# Patient Record
Sex: FEMALE | Race: WHITE | URBAN - METROPOLITAN AREA
[De-identification: names, ages, dates, MRNs, and addresses within clinical notes are randomized per-mention and may not be internally consistent; named-entity substitution may affect disease eponyms.]

---

## 2018-05-24 PROBLEM — I50.21 ACUTE CLINICAL SYSTOLIC HEART FAILURE (HCC): Status: ACTIVE | Noted: 2018-05-24

## 2018-05-31 ENCOUNTER — OFFICE VISIT (OUTPATIENT)
Dept: CARDIOLOGY CLINIC | Age: 58
End: 2018-05-31

## 2018-05-31 VITALS
DIASTOLIC BLOOD PRESSURE: 64 MMHG | HEART RATE: 76 BPM | BODY MASS INDEX: 20.98 KG/M2 | OXYGEN SATURATION: 98 % | HEIGHT: 62 IN | WEIGHT: 114 LBS | SYSTOLIC BLOOD PRESSURE: 102 MMHG

## 2018-05-31 DIAGNOSIS — I10 ESSENTIAL HYPERTENSION: ICD-10-CM

## 2018-05-31 DIAGNOSIS — E87.1 HYPONATREMIA: ICD-10-CM

## 2018-05-31 DIAGNOSIS — I50.21 ACUTE SYSTOLIC HEART FAILURE (HCC): Primary | ICD-10-CM

## 2018-05-31 DIAGNOSIS — I42.8 NONISCHEMIC CARDIOMYOPATHY (HCC): ICD-10-CM

## 2018-05-31 PROCEDURE — 99214 OFFICE O/P EST MOD 30 MIN: CPT | Performed by: NURSE PRACTITIONER

## 2018-05-31 RX ORDER — METOPROLOL SUCCINATE 50 MG/1
25 TABLET, EXTENDED RELEASE ORAL DAILY
COMMUNITY
End: 2018-08-08 | Stop reason: SDUPTHER

## 2018-06-01 ENCOUNTER — HOSPITAL ENCOUNTER (OUTPATIENT)
Dept: OTHER | Age: 58
Discharge: HOME | End: 2018-06-30
Attending: INTERNAL MEDICINE | Admitting: INTERNAL MEDICINE

## 2018-06-05 ENCOUNTER — TELEPHONE (OUTPATIENT)
Dept: PHARMACY | Facility: CLINIC | Age: 58
End: 2018-06-05

## 2018-06-20 ENCOUNTER — OFFICE VISIT (OUTPATIENT)
Dept: PHARMACY | Facility: CLINIC | Age: 58
End: 2018-06-20

## 2018-06-20 VITALS
SYSTOLIC BLOOD PRESSURE: 111 MMHG | HEART RATE: 71 BPM | BODY MASS INDEX: 21.66 KG/M2 | DIASTOLIC BLOOD PRESSURE: 72 MMHG | WEIGHT: 118.4 LBS | OXYGEN SATURATION: 98 %

## 2018-06-20 DIAGNOSIS — I50.21 ACUTE SYSTOLIC HEART FAILURE (HCC): ICD-10-CM

## 2018-06-20 LAB
ANION GAP SERPL CALCULATED.3IONS-SCNC: 16 MMOL/L (ref 3–16)
BUN BLDV-MCNC: 20 MG/DL (ref 7–20)
CALCIUM SERPL-MCNC: 9.7 MG/DL (ref 8.3–10.6)
CHLORIDE BLD-SCNC: 95 MMOL/L (ref 99–110)
CO2: 23 MMOL/L (ref 21–32)
CREAT SERPL-MCNC: <0.5 MG/DL (ref 0.6–1.1)
GFR AFRICAN AMERICAN: >60
GFR NON-AFRICAN AMERICAN: >60
GLUCOSE BLD-MCNC: 98 MG/DL (ref 70–99)
POTASSIUM SERPL-SCNC: 4.1 MMOL/L (ref 3.5–5.1)
SODIUM BLD-SCNC: 134 MMOL/L (ref 136–145)

## 2018-07-01 ENCOUNTER — HOSPITAL ENCOUNTER (OUTPATIENT)
Dept: OTHER | Age: 58
Discharge: HOME | End: 2018-07-31
Attending: INTERNAL MEDICINE | Admitting: INTERNAL MEDICINE

## 2018-08-01 ENCOUNTER — HOSPITAL ENCOUNTER (OUTPATIENT)
Dept: OTHER | Age: 58
Discharge: HOME | End: 2018-08-31
Attending: INTERNAL MEDICINE | Admitting: INTERNAL MEDICINE

## 2018-08-08 ENCOUNTER — OFFICE VISIT (OUTPATIENT)
Dept: CARDIOLOGY CLINIC | Age: 58
End: 2018-08-08

## 2018-08-08 VITALS
OXYGEN SATURATION: 98 % | HEIGHT: 62 IN | SYSTOLIC BLOOD PRESSURE: 142 MMHG | WEIGHT: 121 LBS | BODY MASS INDEX: 22.26 KG/M2 | HEART RATE: 96 BPM | DIASTOLIC BLOOD PRESSURE: 68 MMHG

## 2018-08-08 DIAGNOSIS — I42.8 NONISCHEMIC CARDIOMYOPATHY (HCC): Primary | ICD-10-CM

## 2018-08-08 DIAGNOSIS — I50.22 CHRONIC SYSTOLIC HF (HEART FAILURE) (HCC): ICD-10-CM

## 2018-08-08 DIAGNOSIS — R00.0 TACHYCARDIA: ICD-10-CM

## 2018-08-08 DIAGNOSIS — I10 ESSENTIAL HYPERTENSION: ICD-10-CM

## 2018-08-08 PROCEDURE — 93000 ELECTROCARDIOGRAM COMPLETE: CPT | Performed by: NURSE PRACTITIONER

## 2018-08-08 PROCEDURE — 99214 OFFICE O/P EST MOD 30 MIN: CPT | Performed by: NURSE PRACTITIONER

## 2018-08-08 RX ORDER — METOPROLOL SUCCINATE 50 MG/1
50 TABLET, EXTENDED RELEASE ORAL DAILY
Qty: 30 TABLET | Refills: 3 | Status: SHIPPED | OUTPATIENT
Start: 2018-08-08 | End: 2018-12-12 | Stop reason: SDUPTHER

## 2018-08-08 NOTE — PROGRESS NOTES
[address]  Office Visit    [place]  1960 August 8, 2018    CC:   Chief Complaint   Patient presents with    Follow-up     hf,htn,pt said she feels fine , no cardiac complaints at this time     HPI:  The patient is 62 y.o. female with a past medical history significant for SHF, tobacco use, asthma, hyponatremia, ETOH use who is here for follow up. Admitted 5/24/2018-5/29/2018 with SOB and LE edema. Echo showed LVEF 25% and she was diuresed. She was seen in follow up here in the office and no med changes made at that time. Here for follow up today. Overall feeling quite well. Monitoring sodium and fluids closely. HR at home has been running 50-60's. Feels very anxious today being in the office and feels her HR has increased as a result. Continues to work full-time. Denies chest pain/discomfort, SOB, orthopnea/PND, cough, palpitations, dizziness, syncope, edema , weight change or claudication. Has remained off cigarettes and has noted increased appetite since quitting. Had fall few weeks ago getting out of shower (tripped) and hit lower back (pain has improved). Review of Systems:  Constitutional: Denies  fatigue, weakness, night sweats or fever. HEENT: Denies new visual changes, ringing in ears, nosebleeds, nasal congestion  Respiratory: Denies new or change in SOB, PND, orthopnea or cough. Cardiovascular: see HPI  GI: Denies N/V, diarrhea, constipation, abdominal pain, change in bowel habits, melena or hematochezia  : Denies urinary frequency, urgency, incontinence, hematuria or dysuria. Skin: Denies rash, hives, or cyanosis  Musculoskeletal: Denies joint or muscle aches/pain  Neurological: Denies syncope or TIA-like symptoms. Psychiatric: Denies anxiety, insomnia or depression     Past Medical History:   Diagnosis Date    Asthma     CHF (congestive heart failure) ([facility] [address]     Hypertension      History reviewed. No pertinent surgical history.   Family History a normal mood and affect. Her speech is normal and behavior is normal.     Lab Review:   No results found for: TRIG, HDL, LDLCALC, LDLDIRECT, LABVLDL  Lab Results   Component Value Date     06/20/2018    K 4.1 06/20/2018    CL 95 06/20/2018    CO2 23 06/20/2018    BUN 20 06/20/2018    CREATININE <0.5 06/20/2018    GLUCOSE 98 06/20/2018    CALCIUM 9.7 06/20/2018      Lab Results   Component Value Date    WBC 8.8 05/25/2018    HGB 13.6 05/25/2018    HCT 38.5 05/25/2018    MCV 96.8 05/25/2018     05/25/2018     Echo: 5/24/18  Overall left ventricular systolic function appears severely reduced. Ejection fraction is visually estimated to be 20-25% with diffuse hypokinesis. Left ventricular cavity size is mildly dilated. Diastolic filling parameters suggest grade II diastolic dysfunction. The right ventricle is normal in size and function. The left atrium is severely dilated. Moderate eccentric mitral regurgitation directed posteriorly. Mild tricuspid regurgitation.     Lexiscan-Myoview: 5/26/18  No reversible ischemia. Likely a dilated nonischemic cardiomyopathy. ECG 8/8/2018:  Sinus rhythm with HR 93; LVH; diffuse TW abnormalities    Assessment:    1. Nonischemic cardiomyopathy  -negative Lexiscan-Myoview in 5/2018  -continue ACE-I, aldactone and BB    2. Chronic systolic HF (heart failure) (HCC)  -currently compensated and NYHA class II  -LVEF 20-25% on echo in 5/2018  -continue lasix, aldactone, BB and ACE-I  -low sodium diet and fluid restriction    3. Essential hypertension  -slightly elevated today (goal BP < 120/80 given CHF)  -continue medical management  -will adjust BB given increased HR and BP today    4.  H/O hyponatremia  -normalized after diuresis  -will follow up    Plan:  Increase Toprol to 25 mg BID given her heart rate today  Continue aldactone, lisinopril, lasix  Emphasized and discussed low-fat/low sodium diet, monitoring of daily weights, fluid restriction, worsening signs and symptoms of heart failure and when to call, and the importance of regular exercise and activity. Check echocardiogram in 4-6 weeks  Check BMP  Congratulated on her smoking cessation and encouraged to remain tobacco free. Follow up with Dr. Carly George or D. Enzweiler, CNP in 3 months or sooner if needed    Return in about 3 months (around 11/8/2018) for with Dr. Carly George or D. Enzweiler, CNP. Thanks for allowing me to participate in the care of this patient.       [facility], [address] [facility], [address] [place] [address] [address] [facility], [address] [place], [place] [address]  Office: [phone]  Fax: [phone]

## 2018-08-29 ENCOUNTER — OFFICE VISIT (OUTPATIENT)
Dept: PHARMACY | Facility: CLINIC | Age: 58
End: 2018-08-29

## 2018-08-29 VITALS
HEART RATE: 91 BPM | OXYGEN SATURATION: 98 % | WEIGHT: 122.6 LBS | DIASTOLIC BLOOD PRESSURE: 78 MMHG | SYSTOLIC BLOOD PRESSURE: 120 MMHG | BODY MASS INDEX: 22.42 KG/M2

## 2018-08-29 DIAGNOSIS — I50.22 CHRONIC SYSTOLIC HF (HEART FAILURE) (HCC): ICD-10-CM

## 2018-08-29 DIAGNOSIS — I50.22 CHRONIC SYSTOLIC HEART FAILURE (HCC): Primary | ICD-10-CM

## 2018-08-29 DIAGNOSIS — I42.8 NONISCHEMIC CARDIOMYOPATHY (HCC): ICD-10-CM

## 2018-08-29 LAB
ANION GAP SERPL CALCULATED.3IONS-SCNC: 16 MMOL/L (ref 3–16)
BUN BLDV-MCNC: 17 MG/DL (ref 7–20)
CALCIUM SERPL-MCNC: 10.2 MG/DL (ref 8.3–10.6)
CHLORIDE BLD-SCNC: 93 MMOL/L (ref 99–110)
CO2: 23 MMOL/L (ref 21–32)
CREAT SERPL-MCNC: 0.6 MG/DL (ref 0.6–1.1)
GFR AFRICAN AMERICAN: >60
GFR NON-AFRICAN AMERICAN: >60
GLUCOSE BLD-MCNC: 106 MG/DL (ref 70–99)
POTASSIUM SERPL-SCNC: 4.1 MMOL/L (ref 3.5–5.1)
SODIUM BLD-SCNC: 132 MMOL/L (ref 136–145)

## 2018-08-30 RX ORDER — FUROSEMIDE 20 MG/1
20 TABLET ORAL DAILY
Qty: 30 TABLET | Refills: 2 | Status: SHIPPED | OUTPATIENT
Start: 2018-08-30 | End: 2018-12-12 | Stop reason: SDUPTHER

## 2018-08-30 RX ORDER — LISINOPRIL 2.5 MG/1
2.5 TABLET ORAL DAILY
Qty: 30 TABLET | Refills: 2 | Status: SHIPPED | OUTPATIENT
Start: 2018-08-30 | End: 2018-12-12 | Stop reason: SDUPTHER

## 2018-08-30 RX ORDER — SPIRONOLACTONE 25 MG/1
25 TABLET ORAL NIGHTLY
Qty: 30 TABLET | Refills: 2 | Status: SHIPPED | OUTPATIENT
Start: 2018-08-30 | End: 2018-12-12 | Stop reason: SDUPTHER

## 2018-08-30 NOTE — PROGRESS NOTES
Other    Comments:  No acute findings    Objective     Patient Active Problem List   Diagnosis Code    Acute systolic heart failure (HCC) I50.21    Hyponatremia E87.1    Essential hypertension I10     Social History   Substance Use Topics    Smoking status: Former Smoker     Packs/day: 0.50     Types: Cigarettes    Smokeless tobacco: Former User    Alcohol use 1.2 oz/week     2 Cans of beer per week         BMP:   Lab Results   Component Value Date     08/29/2018    K 4.1 08/29/2018    CL 93 08/29/2018    CO2 23 08/29/2018    BUN 17 08/29/2018    CREATININE 0.6 08/29/2018     CBC:    Lab Results   Component Value Date    WBC 8.8 05/25/2018    HGB 13.6 05/25/2018    HCT 38.5 05/25/2018     05/25/2018     HgA1C: No results found for: LABA1C  TSH: (ref range 0.27 - 4.20 uIU/mL)  Lab Results   Component Value Date    TSH 2.72 05/24/2018     Lipids: No results found for: CHOL, TRIG, HDL, LDLCALC, VLDL  ProBNP:   Lab Results   Component Value Date    PROBNP 3,469 05/27/2018    PROBNP 4,287 05/25/2018    PROBNP 45,225 05/23/2018       Assessment    /78   Pulse 91   Wt 122 lb 9.6 oz (55.6 kg)   SpO2 98%   BMI 22.42 kg/m²     BP Readings from Last 3 Encounters:   08/29/18 120/78   08/08/18 (!) 142/68   06/20/18 111/72       Wt Readings from Last 3 Encounters:   08/29/18 122 lb 9.6 oz (55.6 kg)   08/08/18 121 lb (54.9 kg)   06/20/18 118 lb 6.4 oz (53.7 kg)       Addressed weight gain / loss: She has been gaining weight slowly over time. She feels this is due to not smoking. No signs of edema. Reviewed daily weight log and assessed self management skills: Yes    Encouraged daily weights and to call with increase of 3 pounds in one day or 5 pounds in one week or weight increased above dry weight      Discussed fluid and sodium restriction:  Yes , she is very compliant with her diet and fluid and sodium restriction. Reading labels. Keeping each meal at < 500mg sodium or even less.   She </= 40% with MI (Okay to use if SCr </= 2.5mg/dL in men, SCr </= 2mg/dL in women; Potassium < 5.0meq/L)    Ms. Blima Apley is on a Diuretic         Is the patient taking medications that should be avoided   with Heart Failure such as those listed below?: No: avoiding NSAIDs    NSAIDs:           Thiazolidinediones (pioglitazone):        Cilostazol (Pletal):           Saxagliptin (Onglyza) or Alogliptin (Shannen Keys):     Aliskiren (Tekturna) which is contraindication with an ACE Inh or ARB or Entresto in patients with Diabetes    Reviewed need for labs: Yes, bella BMP today. Reviewed lab results from today's visit and abnormal results were called to the Physician Yes    Addressed co-morbidities significant to Heart Failure:  1. Hypertension - at goal        Addressed Heart Failure Team needs:   No needs    Additional assessment:   Ms. Blima Apley appears very well. She has no questions, no concerns. Following appropriate therapy recommendations. Declines cardiac rehab - very active, continues to work. Declines dietitian - has excellent understanding of diet restrictions. Plan     Daily weights, sodium and fluid restrictions. Report any unusual signs or symptoms including those of heart failure. Report weight gain of 3 pounds in one day or 5 pounds in one week. Hypertension goal <130/80, unless otherwise instructed. Smoking Cessation  Alcohol abstinence    Action taken:  Sent in refills for Lasix, Spironolactone, and Lisinopril. Enough to last until she sees [facility] in November.      The [address] [place] [address] Avenue has referred the patient to the following:  [] Cardiac Rehab [phone]) (see guidelines below)  [] [facility] ([phone])  [] [address] ([phone])  [] Social Work ([phone])   [] Avinash Varghese ([phone])  [] Other  [x] None at this time    Recommendations to the physician:  None    Patient Instructions:  Continue your great work to remain smoke free and following diet restrictions. [facility] Heart Failure Service Follow-up: Will check in by phone in January to assess for further needs at that time.       Follow-up visit will include:   [] Nurse visit, patient assessment  [] Heart Failure Diet Plan with Dietitian  [] BMP  [] BNP  [] Other Labs  [] Labs as needed, to be determined during the visit  [] Physical activity plan  [] Spiritual Care, Advanced Directives  [] Comprehensive Medication Review with the Pharmacist  [] Medication Review  [] Review Patient's Log Booklet    [x] Self Management Skills including daily weights and sodium and fluid restrictions  [x] Smoking Cessation, encourage continued smoke free status  [] Compliance  [] Other          Electronically signed by Ritu Mi PharmD on 8/30/2018 at 9:22 AM

## 2018-09-01 ENCOUNTER — HOSPITAL ENCOUNTER (OUTPATIENT)
Dept: OTHER | Age: 58
Discharge: HOME | End: 2018-09-01
Attending: INTERNAL MEDICINE | Admitting: INTERNAL MEDICINE

## 2018-12-12 ENCOUNTER — TELEPHONE (OUTPATIENT)
Dept: PHARMACY | Age: 58
End: 2018-12-12

## 2018-12-12 RX ORDER — LISINOPRIL 2.5 MG/1
2.5 TABLET ORAL DAILY
Qty: 30 TABLET | Refills: 0 | Status: SHIPPED | OUTPATIENT
Start: 2018-12-12 | End: 2019-01-09 | Stop reason: SDUPTHER

## 2018-12-12 RX ORDER — FUROSEMIDE 20 MG/1
20 TABLET ORAL DAILY
Qty: 30 TABLET | Refills: 0 | Status: SHIPPED | OUTPATIENT
Start: 2018-12-12 | End: 2019-01-09 | Stop reason: SDUPTHER

## 2018-12-12 RX ORDER — METOPROLOL SUCCINATE 50 MG/1
50 TABLET, EXTENDED RELEASE ORAL DAILY
Qty: 30 TABLET | Refills: 0 | Status: SHIPPED | OUTPATIENT
Start: 2018-12-12 | End: 2019-01-09 | Stop reason: SDUPTHER

## 2018-12-12 RX ORDER — SPIRONOLACTONE 25 MG/1
25 TABLET ORAL DAILY
Qty: 30 TABLET | Refills: 0 | Status: SHIPPED | OUTPATIENT
Start: 2018-12-12 | End: 2019-01-09 | Stop reason: SDUPTHER

## 2019-01-10 RX ORDER — LISINOPRIL 2.5 MG/1
2.5 TABLET ORAL DAILY
Qty: 30 TABLET | Refills: 0 | Status: SHIPPED | OUTPATIENT
Start: 2019-01-10 | End: 2019-01-15 | Stop reason: SDUPTHER

## 2019-01-10 RX ORDER — FUROSEMIDE 20 MG/1
20 TABLET ORAL DAILY
Qty: 30 TABLET | Refills: 0 | Status: SHIPPED | OUTPATIENT
Start: 2019-01-10 | End: 2019-06-05 | Stop reason: SDUPTHER

## 2019-01-10 RX ORDER — SPIRONOLACTONE 25 MG/1
25 TABLET ORAL DAILY
Qty: 30 TABLET | Refills: 0 | Status: SHIPPED | OUTPATIENT
Start: 2019-01-10 | End: 2019-01-15 | Stop reason: SDUPTHER

## 2019-01-10 RX ORDER — METOPROLOL SUCCINATE 50 MG/1
50 TABLET, EXTENDED RELEASE ORAL DAILY
Qty: 30 TABLET | Refills: 0 | Status: SHIPPED | OUTPATIENT
Start: 2019-01-10 | End: 2019-01-15 | Stop reason: SDUPTHER

## 2019-01-15 ENCOUNTER — OFFICE VISIT (OUTPATIENT)
Dept: CARDIOLOGY CLINIC | Age: 59
End: 2019-01-15
Payer: COMMERCIAL

## 2019-01-15 VITALS
HEART RATE: 99 BPM | DIASTOLIC BLOOD PRESSURE: 64 MMHG | BODY MASS INDEX: 23.57 KG/M2 | HEIGHT: 63 IN | SYSTOLIC BLOOD PRESSURE: 110 MMHG | OXYGEN SATURATION: 98 % | WEIGHT: 133 LBS

## 2019-01-15 DIAGNOSIS — I42.8 NONISCHEMIC CARDIOMYOPATHY (HCC): Primary | ICD-10-CM

## 2019-01-15 DIAGNOSIS — I10 ESSENTIAL HYPERTENSION: ICD-10-CM

## 2019-01-15 DIAGNOSIS — I50.22 CHRONIC SYSTOLIC HF (HEART FAILURE) (HCC): ICD-10-CM

## 2019-01-15 PROCEDURE — 99213 OFFICE O/P EST LOW 20 MIN: CPT | Performed by: NURSE PRACTITIONER

## 2019-01-15 RX ORDER — SPIRONOLACTONE 25 MG/1
25 TABLET ORAL DAILY
Qty: 30 TABLET | Refills: 3 | Status: SHIPPED | OUTPATIENT
Start: 2019-01-15 | End: 2019-06-05 | Stop reason: SDUPTHER

## 2019-01-15 RX ORDER — METOPROLOL SUCCINATE 50 MG/1
50 TABLET, EXTENDED RELEASE ORAL DAILY
Qty: 30 TABLET | Refills: 3 | Status: SHIPPED | OUTPATIENT
Start: 2019-01-15 | End: 2019-06-05 | Stop reason: SDUPTHER

## 2019-01-15 RX ORDER — LISINOPRIL 2.5 MG/1
2.5 TABLET ORAL DAILY
Qty: 30 TABLET | Refills: 3 | Status: SHIPPED | OUTPATIENT
Start: 2019-01-15 | End: 2019-06-05 | Stop reason: SDUPTHER

## 2019-01-16 ENCOUNTER — SCHEDULED TELEPHONE ENCOUNTER (OUTPATIENT)
Dept: PHARMACY | Age: 59
End: 2019-01-16

## 2019-01-30 ENCOUNTER — SCHEDULED TELEPHONE ENCOUNTER (OUTPATIENT)
Dept: PHARMACY | Age: 59
End: 2019-01-30

## 2019-02-13 ENCOUNTER — SCHEDULED TELEPHONE ENCOUNTER (OUTPATIENT)
Dept: PHARMACY | Age: 59
End: 2019-02-13

## 2019-03-14 ENCOUNTER — SCHEDULED TELEPHONE ENCOUNTER (OUTPATIENT)
Dept: PHARMACY | Age: 59
End: 2019-03-14

## 2019-04-01 ENCOUNTER — TELEPHONE (OUTPATIENT)
Dept: PHARMACY | Age: 59
End: 2019-04-01

## 2019-04-01 NOTE — TELEPHONE ENCOUNTER
[address] [facility]  Heart Failure Service  [phone]        Follow up phone call:     I called patient again to reschedule last missed appointment I did leave a message to please call back to let us know if she will be returning to the [facility] . She was last seen here in December of 2018, she does have a follow up appointment at [facility] in May of 2019.     PLAN:   I will discuss possible discharge or continue to try scheduling appointments.     Appropriate staff has been notified with regards to any concerns noted above   [address] Service  [phone]

## 2019-04-12 NOTE — TELEPHONE ENCOUNTER
We will discharge since we have made several attempts to reach her unsuccessfully. And she did not show for her March visit. She does have a good understanding of her heart failure care and was doing well when we last saw her. She is welcome any time.      Herbert Goss, [address] [facility]  Heart Failure Service  [phone]

## 2019-06-05 DIAGNOSIS — I50.22 CHRONIC SYSTOLIC HF (HEART FAILURE) (HCC): ICD-10-CM

## 2019-06-05 DIAGNOSIS — I42.8 NONISCHEMIC CARDIOMYOPATHY (HCC): ICD-10-CM

## 2019-06-05 RX ORDER — FUROSEMIDE 20 MG/1
20 TABLET ORAL DAILY
Qty: 30 TABLET | Refills: 3 | Status: SHIPPED | OUTPATIENT
Start: 2019-06-05 | End: 2019-10-07 | Stop reason: SDUPTHER

## 2019-06-05 RX ORDER — METOPROLOL SUCCINATE 50 MG/1
50 TABLET, EXTENDED RELEASE ORAL DAILY
Qty: 30 TABLET | Refills: 3 | Status: SHIPPED | OUTPATIENT
Start: 2019-06-05 | End: 2019-10-07 | Stop reason: SDUPTHER

## 2019-06-05 RX ORDER — SPIRONOLACTONE 25 MG/1
25 TABLET ORAL DAILY
Qty: 30 TABLET | Refills: 3 | Status: SHIPPED | OUTPATIENT
Start: 2019-06-05 | End: 2019-10-07 | Stop reason: SDUPTHER

## 2019-06-05 RX ORDER — LISINOPRIL 2.5 MG/1
2.5 TABLET ORAL DAILY
Qty: 30 TABLET | Refills: 3 | Status: SHIPPED | OUTPATIENT
Start: 2019-06-05 | End: 2019-10-07 | Stop reason: SDUPTHER

## 2019-06-05 NOTE — TELEPHONE ENCOUNTER
Medication Refill    When was your last appointment with cardiology?      (if  it's been more than 1 year, please go ahead and schedule an appointment with the physician while you have the patient on the phone)      Medication needing refilled: Spironolactone  Metoprolol  Lisinopril  Furosemide    Doseage of the medication:  25 mg  50 mg  2.5 mg  20 mg    How are you taking this medication (QD, BID, TID, QID, PRN):  Take 1 tablet by mouth daily  Take 1 tablet by mouth daily  Take 1 tablet by mouth daily  Take 1 tablet by mouth daily    Patient want a 30 or 90 day supply called in:    Which Pharmacy are we sending the medication to:  [address], [address]

## 2019-06-05 NOTE — TELEPHONE ENCOUNTER
She needs BMP performed prior to any further refills. Needs to keep her appointment as scheduled next month with Dr. Gonsalo Pollack. Please call Christal Portillo.

## 2019-07-31 ENCOUNTER — OFFICE VISIT (OUTPATIENT)
Dept: CARDIOLOGY CLINIC | Age: 59
End: 2019-07-31
Payer: COMMERCIAL

## 2019-07-31 VITALS
HEART RATE: 97 BPM | DIASTOLIC BLOOD PRESSURE: 70 MMHG | SYSTOLIC BLOOD PRESSURE: 130 MMHG | WEIGHT: 134 LBS | OXYGEN SATURATION: 97 % | HEIGHT: 62 IN | BODY MASS INDEX: 24.66 KG/M2

## 2019-07-31 DIAGNOSIS — Z72.0 TOBACCO ABUSE: ICD-10-CM

## 2019-07-31 DIAGNOSIS — I10 ESSENTIAL HYPERTENSION: ICD-10-CM

## 2019-07-31 DIAGNOSIS — I50.22 CHRONIC SYSTOLIC CHF (CONGESTIVE HEART FAILURE), NYHA CLASS 2 (HCC): Primary | ICD-10-CM

## 2019-07-31 DIAGNOSIS — I50.22 CHRONIC SYSTOLIC HEART FAILURE (HCC): ICD-10-CM

## 2019-07-31 LAB
ANION GAP SERPL CALCULATED.3IONS-SCNC: 16 MMOL/L (ref 3–16)
BUN BLDV-MCNC: 11 MG/DL (ref 7–20)
CALCIUM SERPL-MCNC: 10.4 MG/DL (ref 8.3–10.6)
CHLORIDE BLD-SCNC: 96 MMOL/L (ref 99–110)
CO2: 23 MMOL/L (ref 21–32)
CREAT SERPL-MCNC: 0.6 MG/DL (ref 0.6–1.1)
GFR AFRICAN AMERICAN: >60
GFR NON-AFRICAN AMERICAN: >60
GLUCOSE BLD-MCNC: 85 MG/DL (ref 70–99)
POTASSIUM SERPL-SCNC: 4.6 MMOL/L (ref 3.5–5.1)
PRO-BNP: 400 PG/ML (ref 0–124)
SODIUM BLD-SCNC: 135 MMOL/L (ref 136–145)

## 2019-07-31 PROCEDURE — 99214 OFFICE O/P EST MOD 30 MIN: CPT | Performed by: INTERNAL MEDICINE

## 2019-07-31 PROCEDURE — 93000 ELECTROCARDIOGRAM COMPLETE: CPT | Performed by: INTERNAL MEDICINE

## 2019-07-31 NOTE — PATIENT INSTRUCTIONS
if:    · You have new or increased shortness of breath.     · You are dizzy or lightheaded, or you feel like you may faint.     · You have sudden weight gain, such as more than 2 to 3 pounds in a day or 5 pounds in a week. (Your doctor may suggest a different range of weight gain.)     · You have increased swelling in your legs, ankles, or feet.     · You are suddenly so tired or weak that you cannot do your usual activities.    Watch closely for changes in your health, and be sure to contact your doctor if you develop new symptoms. Where can you learn more? Go to https://[email or web address] [email or web address]. org and sign in to your [employer] account. Enter P052 in the [employer] box to learn more about \"Managing Other Conditions When You Have Heart Failure: Care Instructions. \"     If you do not have an account, please click on the \"Sign Up Now\" link. Current as of: July 22, 2018  Content Version: 12.0  © 0209-9671 Healthwise, Incorporated. Care instructions adapted under license by [facility] ([facility]). If you have questions about a medical condition or this instruction, always ask your healthcare professional. [place] [address] any warranty or liability for your use of this information.

## 2019-10-07 DIAGNOSIS — I42.8 NONISCHEMIC CARDIOMYOPATHY (HCC): ICD-10-CM

## 2019-10-07 DIAGNOSIS — I50.22 CHRONIC SYSTOLIC HF (HEART FAILURE) (HCC): ICD-10-CM

## 2019-10-07 RX ORDER — METOPROLOL SUCCINATE 50 MG/1
50 TABLET, EXTENDED RELEASE ORAL DAILY
Qty: 30 TABLET | Refills: 5 | Status: CANCELLED | OUTPATIENT
Start: 2019-10-07

## 2019-10-07 RX ORDER — FUROSEMIDE 20 MG/1
20 TABLET ORAL DAILY
Qty: 30 TABLET | Refills: 5 | Status: CANCELLED | OUTPATIENT
Start: 2019-10-07

## 2019-10-07 RX ORDER — LISINOPRIL 2.5 MG/1
2.5 TABLET ORAL DAILY
Qty: 30 TABLET | Refills: 3 | Status: SHIPPED | OUTPATIENT
Start: 2019-10-07 | End: 2020-02-11

## 2019-10-07 RX ORDER — FUROSEMIDE 20 MG/1
20 TABLET ORAL DAILY
Qty: 30 TABLET | Refills: 3 | Status: SHIPPED | OUTPATIENT
Start: 2019-10-07 | End: 2020-02-11

## 2019-10-07 RX ORDER — LISINOPRIL 2.5 MG/1
2.5 TABLET ORAL DAILY
Qty: 30 TABLET | Refills: 5 | Status: CANCELLED | OUTPATIENT
Start: 2019-10-07

## 2019-10-07 RX ORDER — SPIRONOLACTONE 25 MG/1
25 TABLET ORAL DAILY
Qty: 30 TABLET | Refills: 5 | Status: CANCELLED | OUTPATIENT
Start: 2019-10-07

## 2019-10-07 RX ORDER — SPIRONOLACTONE 25 MG/1
25 TABLET ORAL DAILY
Qty: 30 TABLET | Refills: 3 | Status: SHIPPED | OUTPATIENT
Start: 2019-10-07 | End: 2020-02-11

## 2019-10-07 RX ORDER — METOPROLOL SUCCINATE 50 MG/1
50 TABLET, EXTENDED RELEASE ORAL DAILY
Qty: 30 TABLET | Refills: 3 | Status: SHIPPED | OUTPATIENT
Start: 2019-10-07 | End: 2020-02-11

## 2019-10-08 DIAGNOSIS — I50.22 CHRONIC SYSTOLIC HF (HEART FAILURE) (HCC): ICD-10-CM

## 2019-10-08 DIAGNOSIS — I42.8 NONISCHEMIC CARDIOMYOPATHY (HCC): ICD-10-CM

## 2019-10-08 RX ORDER — LISINOPRIL 2.5 MG/1
2.5 TABLET ORAL DAILY
Qty: 30 TABLET | Refills: 3 | Status: CANCELLED | OUTPATIENT
Start: 2019-10-08

## 2019-10-08 RX ORDER — SPIRONOLACTONE 25 MG/1
25 TABLET ORAL DAILY
Qty: 30 TABLET | Refills: 3 | Status: CANCELLED | OUTPATIENT
Start: 2019-10-08

## 2019-10-08 RX ORDER — FUROSEMIDE 20 MG/1
20 TABLET ORAL DAILY
Qty: 30 TABLET | Refills: 3 | Status: CANCELLED | OUTPATIENT
Start: 2019-10-08

## 2019-10-08 RX ORDER — METOPROLOL SUCCINATE 50 MG/1
50 TABLET, EXTENDED RELEASE ORAL DAILY
Qty: 30 TABLET | Refills: 3 | Status: CANCELLED | OUTPATIENT
Start: 2019-10-08

## 2020-02-11 RX ORDER — FUROSEMIDE 20 MG/1
TABLET ORAL
Qty: 30 TABLET | Refills: 0 | Status: SHIPPED | OUTPATIENT
Start: 2020-02-11 | End: 2020-03-10 | Stop reason: SDUPTHER

## 2020-02-11 RX ORDER — LISINOPRIL 2.5 MG/1
TABLET ORAL
Qty: 30 TABLET | Refills: 1 | Status: SHIPPED | OUTPATIENT
Start: 2020-02-11 | End: 2020-04-14 | Stop reason: SDUPTHER

## 2020-02-11 RX ORDER — METOPROLOL SUCCINATE 50 MG/1
TABLET, EXTENDED RELEASE ORAL
Qty: 30 TABLET | Refills: 0 | Status: SHIPPED | OUTPATIENT
Start: 2020-02-11 | End: 2020-03-10 | Stop reason: SDUPTHER

## 2020-02-11 RX ORDER — SPIRONOLACTONE 25 MG/1
TABLET ORAL
Qty: 30 TABLET | Refills: 0 | Status: SHIPPED | OUTPATIENT
Start: 2020-02-11 | End: 2020-03-10 | Stop reason: SDUPTHER

## 2020-03-10 RX ORDER — METOPROLOL SUCCINATE 50 MG/1
TABLET, EXTENDED RELEASE ORAL
Qty: 30 TABLET | Refills: 2 | Status: SHIPPED | OUTPATIENT
Start: 2020-03-10 | End: 2020-07-14 | Stop reason: SDUPTHER

## 2020-03-10 RX ORDER — SPIRONOLACTONE 25 MG/1
TABLET ORAL
Qty: 30 TABLET | Refills: 2 | Status: SHIPPED | OUTPATIENT
Start: 2020-03-10 | End: 2020-07-14 | Stop reason: SDUPTHER

## 2020-03-10 RX ORDER — FUROSEMIDE 20 MG/1
TABLET ORAL
Qty: 30 TABLET | Refills: 2 | Status: SHIPPED | OUTPATIENT
Start: 2020-03-10 | End: 2020-07-14 | Stop reason: SDUPTHER

## 2020-04-14 RX ORDER — LISINOPRIL 2.5 MG/1
TABLET ORAL
Qty: 30 TABLET | Refills: 1 | Status: CANCELLED | OUTPATIENT
Start: 2020-04-14

## 2020-04-15 RX ORDER — LISINOPRIL 2.5 MG/1
TABLET ORAL
Qty: 30 TABLET | Refills: 1 | Status: SHIPPED | OUTPATIENT
Start: 2020-04-15 | End: 2020-07-14 | Stop reason: SDUPTHER

## 2020-07-14 RX ORDER — FUROSEMIDE 20 MG/1
TABLET ORAL
Qty: 30 TABLET | Refills: 5 | Status: SHIPPED | OUTPATIENT
Start: 2020-07-14 | End: 2021-01-21

## 2020-07-14 RX ORDER — SPIRONOLACTONE 25 MG/1
TABLET ORAL
Qty: 30 TABLET | Refills: 5 | Status: SHIPPED | OUTPATIENT
Start: 2020-07-14 | End: 2021-01-21

## 2020-07-14 RX ORDER — METOPROLOL SUCCINATE 50 MG/1
TABLET, EXTENDED RELEASE ORAL
Qty: 30 TABLET | Refills: 5 | Status: SHIPPED | OUTPATIENT
Start: 2020-07-14 | End: 2021-01-21

## 2020-07-14 RX ORDER — LISINOPRIL 2.5 MG/1
TABLET ORAL
Qty: 30 TABLET | Refills: 5 | Status: SHIPPED | OUTPATIENT
Start: 2020-07-14 | End: 2021-01-21

## 2020-07-24 RX ORDER — POTASSIUM CHLORIDE 750 MG/1
10 TABLET, EXTENDED RELEASE ORAL DAILY
Qty: 30 TABLET | Refills: 5 | OUTPATIENT
Start: 2020-07-24

## 2020-07-24 NOTE — TELEPHONE ENCOUNTER
The pharmacist is suggesting the patient be placed on Potassium Chloride 10MEQ due to muscle cramps in their legs 1-2 times per week and pt is on a loop diuretic.

## 2020-07-24 NOTE — TELEPHONE ENCOUNTER
Will not adjust medications as patient has not been seen in 1 year and has not had any lab work completed. Will need CMP and lipids completed. IF K+ is normal, no need to add KCL.      Please call

## 2020-07-27 NOTE — TELEPHONE ENCOUNTER
Please call to notify the patient to complete lab work and follow up with Dr. Enedelia Zuluaga as scheduled 7/29/20. Refills can be completed during office visit.

## 2020-07-27 NOTE — TELEPHONE ENCOUNTER
Patient states she does not take potassium and never has besides the hospital. Patient does not want to complete blood work at this time. She states the lisinopril and metoprolol was already sent in.

## 2020-07-29 ENCOUNTER — OFFICE VISIT (OUTPATIENT)
Dept: CARDIOLOGY CLINIC | Age: 60
End: 2020-07-29

## 2020-07-29 ENCOUNTER — TELEPHONE (OUTPATIENT)
Dept: CARDIOLOGY CLINIC | Age: 60
End: 2020-07-29

## 2020-07-29 VITALS
HEIGHT: 62 IN | DIASTOLIC BLOOD PRESSURE: 78 MMHG | OXYGEN SATURATION: 96 % | SYSTOLIC BLOOD PRESSURE: 120 MMHG | BODY MASS INDEX: 25.62 KG/M2 | TEMPERATURE: 98.3 F | WEIGHT: 139.2 LBS | HEART RATE: 107 BPM

## 2020-07-29 PROCEDURE — 93000 ELECTROCARDIOGRAM COMPLETE: CPT | Performed by: INTERNAL MEDICINE

## 2020-07-29 PROCEDURE — 99214 OFFICE O/P EST MOD 30 MIN: CPT | Performed by: INTERNAL MEDICINE

## 2020-07-29 NOTE — PROGRESS NOTES
Brett  1960 July 29, 2020      CC: \"I have been gaining weight. \"    HPI:  The patient is 61 y.o. female with a past medical history significant for tobacco abuse, asthma and systolic heart failure who was first seen in 2018 when he presented to [facility] with shortness of breath. She stated that her legs were bloated three days. She also noted that her abdomen at her waistline was distended. She related feeling short of breath climbing her 15 steps at home. There was some chest tightness that felt like \"bronchitis\". 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 Discharged after being hospitalized for 3 days. Has been following up with [facility]. Today, she is here for routine follow up. She says that she has gained weight due inactivity. She has gained about 6 pounds. She has no issues breathing and is able to lay flat without any issue as well. She has not completed her repeat echocardiogram due to not having insurance. She continues to smoke but says that she has cut down. Patient denies exertional chest pain/pressure, dyspnea at rest, CANO, PND, orthopnea, palpitations, lightheadedness, weight changes, changes in LE edema, and syncope. Review of Systems:  Constitutional: No fatigue, weakness, night sweats or fever. HEENT: No new vision difficulties or ringing in the ears. Respiratory: No new SOB, PND, orthopnea or cough. Cardiovascular: See HPI   GI: No n/v, diarrhea, constipation, abdominal pain or changes in bowel habits. No melena, no hematochezia  : No urinary frequency, urgency, incontinence, hematuria or dysuria. Skin: No cyanosis or skin lesions. Musculoskeletal: No new muscle or joint pain. Neurological: No syncope or TIA-like symptoms.   Psychiatric: No anxiety, insomnia or depression     Past Medical History:   Diagnosis Date    Asthma     CHF (congestive heart failure) ([facility] [address]     regular rhythm, normal heart sounds and intact distal pulses. Exam reveals no gallop and no friction rub. No murmur heard. Pulmonary/Chest: Effort normal and breath sounds normal. No respiratory distress. She has no wheezes, rhonchi or rales. Abdominal: Soft, non-tender. Bowel sounds and aorta are normal. She exhibits no organomegaly, mass or bruit. Extremities: No edema, cyanosis, or clubbing. Pulses are 2+ radial/carotid/dorsalis pedis and posterior tibial bilaterally. Neurological: She is alert and oriented to person, place, and time. She has normal reflexes. No cranial nerve deficit. Coordination normal.   Skin: Skin is warm and dry. There is no rash or diaphoresis. Psychiatric: She has a normal mood and affect. Her speech is normal and behavior is normal.       EKG Interpretation: 7/31/19 Sinus rhythm     7/29/2020 Sinus rhythm       Stress Test: 5/24/18  Abnormal study.    Severely dilated left ventricle with severely reduced ejection fraction of    23 %.    There is a moderate sized, mild intensity, inferior wall defect which    appears worse at rest and is most consistent with diaphragmatic attenuation    and bowel artifact. However, cannot exclude prior inferior infarct as the    wall motion/wall thickening are not normal globally.    Overall findings represent a high risk study based on severely reduced LV    function. ECHO: 5/24/18  Overall left ventricular systolic function appears severely reduced. Ejection fraction is visually estimated to be 20-25% with diffuse hypokinesis. Left ventricular cavity size is mildly dilated. Diastolic filling parameters suggest grade II diastolic dysfunction. The right ventricle is normal in size and function. The left atrium is severely dilated. Moderate eccentric mitral regurgitation directed posteriorly. Mild tricuspid regurgitation.     Lab Review:     Lab Results   Component Value Date     07/31/2019    K 4.6 07/31/2019    BUN 11 07/31/2019 CREATININE 0.6 07/31/2019         Assessment:  1. Chronic Systolic CHF, class 2  2. Essential Hypertension  3. Tobacco Abuse    Plan:  She appears euvolemic on exam except for some scattered wheezes. Increase Lasix to 40 mg x 2 days then resume 20 mg daily due to her recent weight gain. Continue b-blocker and Ace-I. I will repeat her echocardiogram prior to next visit. She does not have insurance at this time. I have encouraged smoking cessation. I will see her back in follow up in 6 months. This note was scribed in the presence of Dr Mayank Zendejas, by Saúl Johnson RN    Physician Attestation:  The scribes documentation has been prepared under my direction and personally reviewed by me in its entirety. I, Dr. Mayank Zendejas personally performed the services described in this documentation as scribed by my RN,  Saúl Johnson in my presence, and I confirm that the note above accurately reflects all work, treatment, procedures, and medical decision making performed by me.

## 2020-07-29 NOTE — TELEPHONE ENCOUNTER
Debbie Eli from [address] [place] [address] called in stating that she spoke to the patient and she states that she is having muscle cramps more than twice a week. She states that the patient is not on any potassium and recommends that we add potassium into her medication regimen due to the muscle cramping. You can reach Debbie Eli at [phone], ext. [phone].

## 2020-07-29 NOTE — TELEPHONE ENCOUNTER
Patient is taking lasix 20 mg daily, spironolactone 25 daily.      Last BMP was 7/31/19 and potassium was normal 4.6

## 2020-07-29 NOTE — PATIENT INSTRUCTIONS
Patient Education        Stopping Smoking: Care Instructions  Your Care Instructions     Cigarette smokers crave the nicotine in cigarettes. Giving it up is much harder than simply changing a habit. Your body has to stop craving the nicotine. It is hard to quit, but you can do it. There are many tools that people use to quit smoking. You may find that combining tools works best for you. There are several steps to quitting. First you get ready to quit. Then you get support to help you. After that, you learn new skills and behaviors to become a nonsmoker. For many people, a necessary step is getting and using medicine. Your doctor will help you set up the plan that best meets your needs. You may want to attend a smoking cessation program to help you quit smoking. When you choose a program, look for one that has proven success. Ask your doctor for ideas. You will greatly increase your chances of success if you take medicine as well as get counseling or join a cessation program.  Some of the changes you feel when you first quit tobacco are uncomfortable. Your body will miss the nicotine at first, and you may feel short-tempered and grumpy. You may have trouble sleeping or concentrating. Medicine can help you deal with these symptoms. You may struggle with changing your smoking habits and rituals. The last step is the tricky one: Be prepared for the smoking urge to continue for a time. This is a lot to deal with, but keep at it. You will feel better. Follow-up care is a key part of your treatment and safety. Be sure to make and go to all appointments, and call your doctor if you are having problems. It's also a good idea to know your test results and keep a list of the medicines you take. How can you care for yourself at home? · Ask your family, friends, and coworkers for support. You have a better chance of quitting if you have help and support.   · Join a support group, such as Nicotine Anonymous, for people who are trying to quit smoking. · Consider signing up for a smoking cessation program, such as the American Lung Association's Freedom from Smoking program.  · Get text messaging support. Go to the website at www.smokefree. gov to sign up for the [facility] program.  · Set a quit date. Pick your date carefully so that it is not right in the middle of a big deadline or stressful time. Once you quit, do not even take a puff. Get rid of all ashtrays and lighters after your last cigarette. Clean your house and your clothes so that they do not smell of smoke. · Learn how to be a nonsmoker. Think about ways you can avoid those things that make you reach for a cigarette. ? Avoid situations that put you at greatest risk for smoking. For some people, it is hard to have a drink with friends without smoking. For others, they might skip a coffee break with coworkers who smoke. ? Change your daily routine. Take a different route to work or eat a meal in a different place. · Cut down on stress. Calm yourself or release tension by doing an activity you enjoy, such as reading a book, taking a hot bath, or gardening. · Talk to your doctor or pharmacist about nicotine replacement therapy, which replaces the nicotine in your body. You still get nicotine but you do not use tobacco. Nicotine replacement products help you slowly reduce the amount of nicotine you need. These products come in several forms, many of them available over-the-counter:  ? Nicotine patches  ? Nicotine gum and lozenges  ? Nicotine inhaler  · Ask your doctor about bupropion (Wellbutrin) or varenicline (Chantix), which are prescription medicines. They do not contain nicotine. They help you by reducing withdrawal symptoms, such as stress and anxiety. · Some people find hypnosis, acupuncture, and massage helpful for ending the smoking habit. · Eat a healthy diet and get regular exercise.  Having healthy habits will help your body move past its craving for nicotine. · Be prepared to keep trying. Most people are not successful the first few times they try to quit. Do not get mad at yourself if you smoke again. Make a list of things you learned and think about when you want to try again, such as next week, next month, or next year. Where can you learn more? Go to https://Ensapemalcolmewchey.Missingames. org and sign in to your [employer] account. Enter E484 in the Captify box to learn more about \"Stopping Smoking: Care Instructions. \"     If you do not have an account, please click on the \"Sign Up Now\" link. Current as of: March 12, 2020               Content Version: 12.5  © 2006-2020 Healthwise, Incorporated. Care instructions adapted under license by [facility] ([facility]). If you have questions about a medical condition or this instruction, always ask your healthcare professional. [address] any warranty or liability for your use of this information.

## 2020-07-30 NOTE — TELEPHONE ENCOUNTER
Spoke with patient and advised to complete a BMP next week to assess potassium and her kidney function due to temporary increase of Lasix.  Order placed and she v/u

## 2020-07-30 NOTE — TELEPHONE ENCOUNTER
I looked at her appointment desk and this patient was just seen yesterday with Dr. Gavino Ruiz. She is not supposed to return until January of 2021.

## 2021-01-21 DIAGNOSIS — I50.22 CHRONIC SYSTOLIC HF (HEART FAILURE) (HCC): ICD-10-CM

## 2021-01-21 DIAGNOSIS — I42.8 NONISCHEMIC CARDIOMYOPATHY (HCC): ICD-10-CM

## 2021-01-21 RX ORDER — SPIRONOLACTONE 25 MG/1
TABLET ORAL
Qty: 90 TABLET | Refills: 3 | Status: SHIPPED | OUTPATIENT
Start: 2021-01-21 | End: 2021-12-28

## 2021-01-21 RX ORDER — METOPROLOL SUCCINATE 50 MG/1
TABLET, EXTENDED RELEASE ORAL
Qty: 90 TABLET | Refills: 3 | Status: SHIPPED | OUTPATIENT
Start: 2021-01-21 | End: 2022-06-29 | Stop reason: SDUPTHER

## 2021-01-21 RX ORDER — LISINOPRIL 2.5 MG/1
TABLET ORAL
Qty: 90 TABLET | Refills: 3 | Status: SHIPPED | OUTPATIENT
Start: 2021-01-21 | End: 2021-12-28

## 2021-01-21 RX ORDER — FUROSEMIDE 20 MG/1
TABLET ORAL
Qty: 90 TABLET | Refills: 3 | Status: SHIPPED | OUTPATIENT
Start: 2021-01-21 | End: 2021-12-28

## 2021-04-12 NOTE — PROGRESS NOTES
[place]  1960 April 14, 2021      CC: \"I have been gaining weight. \"    HPI:  The patient is 61 y.o. female with a past medical history significant for tobacco abuse, asthma and systolic heart failure who was first seen in 2018 when he presented to [facility] with shortness of breath. She stated that her legs were bloated three days. She also noted that her abdomen at her waistline was distended. She related feeling short of breath climbing her 15 steps at home. There was some chest tightness that felt like \"bronchitis\". 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 Discharged after being hospitalized for 3 days. Today, she is here for routine follow up. She says that she has gained weight due inactivity. She has gained about 6 pounds. She has no issues breathing and is able to lay flat without any issue as well. She has not completed her repeat echocardiogram due to not having insurance. She continues to smoke but says that she has cut down. Patient denies exertional chest pain/pressure, dyspnea at rest, CANO, PND, orthopnea, palpitations, lightheadedness, weight changes, changes in LE edema, and syncope. Review of Systems:  Constitutional: No fatigue, weakness, night sweats or fever. HEENT: No new vision difficulties or ringing in the ears. Respiratory: No new SOB, PND, orthopnea or cough. Cardiovascular: See HPI   GI: No n/v, diarrhea, constipation, abdominal pain or changes in bowel habits. No melena, no hematochezia  : No urinary frequency, urgency, incontinence, hematuria or dysuria. Skin: No cyanosis or skin lesions. Musculoskeletal: No new muscle or joint pain. Neurological: No syncope or TIA-like symptoms.   Psychiatric: No anxiety, insomnia or depression     Past Medical History:   Diagnosis Date    Asthma     CHF (congestive heart failure) (HCC)     Hypertension      No past surgical history on file.  Family History   Problem Relation Age of Onset    Cancer Father         throat     Other Father         alzheimer     Social History     Tobacco Use    Smoking status: Former Smoker     Packs/day: 0.50     Types: Cigarettes    Smokeless tobacco: Former User   Substance Use Topics    Alcohol use: Yes     Alcohol/week: 2.0 standard drinks     Types: 2 Cans of beer per week    Drug use: No       Allergies   Allergen Reactions    Benadryl [Diphenhydramine]      Breaks out in hives      Current Outpatient Medications   Medication Sig Dispense Refill    metoprolol succinate (TOPROL XL) 50 MG extended release tablet Pt states she takes half pill in the morning and half pill in the evening 90 tablet 3    spironolactone (ALDACTONE) 25 MG tablet TAKE 1 TABLET (25MG) BY MOUTH EVERY DAY 90 tablet 3    furosemide (LASIX) 20 MG tablet TAKE 1 TABLET (20MG) BY MOUTH EVERY DAY 90 tablet 3    lisinopril (PRINIVIL;ZESTRIL) 2.5 MG tablet TAKE ONE TABLET BY MOUTH DAILY 90 tablet 3    Multiple Vitamins-Minerals (ONE-A-DAY WOMENS 50+ ADVANTAGE PO) Take 1 tablet by mouth daily       No current facility-administered medications for this visit. Physical Exam:   There were no vitals taken for this visit. No intake or output data in the 24 hours ending 04/14/21 0904  Wt Readings from Last 2 Encounters:   07/29/20 139 lb 3.2 oz (63.1 kg)   07/31/19 134 lb (60.8 kg)     Constitutional: She is oriented to person, place, and time. She appears well-developed and well-nourished. In no acute distress. Head: Normocephalic and atraumatic. Neck: Neck supple. No JVD present. Carotid bruit is not present. No mass and no thyromegaly present. No lymphadenopathy present. Cardiovascular: Normal rate, regular rhythm, normal heart sounds and intact distal pulses. Exam reveals no gallop and no friction rub. No murmur heard. Pulmonary/Chest: Effort normal and breath sounds normal. No respiratory distress.  She has no wheezes, rhonchi or rales. Abdominal: Soft, non-tender. Bowel sounds and aorta are normal. She exhibits no organomegaly, mass or bruit. Extremities: No edema, cyanosis, or clubbing. Pulses are 2+ radial/carotid/dorsalis pedis and posterior tibial bilaterally. Neurological: She is alert and oriented to person, place, and time. She has normal reflexes. No cranial nerve deficit. Coordination normal.   Skin: Skin is warm and dry. There is no rash or diaphoresis. Psychiatric: She has a normal mood and affect. Her speech is normal and behavior is normal.       EKG Interpretation 7/31/19: Sinus rhythm  EKG Interpretation 7/29/2020: Sinus rhythm       Imaging:     Stress Test 5/24/18  Abnormal study.    Severely dilated left ventricle with severely reduced ejection fraction of    23 %.    There is a moderate sized, mild intensity, inferior wall defect which    appears worse at rest and is most consistent with diaphragmatic attenuation    and bowel artifact. However, cannot exclude prior inferior infarct as the    wall motion/wall thickening are not normal globally.    Overall findings represent a high risk study based on severely reduced LV    function. Echo 5/24/18  Overall left ventricular systolic function appears severely reduced. Ejection fraction is visually estimated to be 20-25% with diffuse hypokinesis. Left ventricular cavity size is mildly dilated. Diastolic filling parameters suggest grade II diastolic dysfunction. The right ventricle is normal in size and function. The left atrium is severely dilated. Moderate eccentric mitral regurgitation directed posteriorly. Mild tricuspid regurgitation. Lab Review:     Lab Results   Component Value Date     07/31/2019    K 4.6 07/31/2019    BUN 11 07/31/2019    CREATININE 0.6 07/31/2019         Assessment:  1. Chronic Systolic CHF, class 2  2. Essential Hypertension  3. Tobacco Abuse    Plan:  She appears euvolemic on exam except for some scattered wheezes. Increase Lasix to 40 mg x 2 days then resume 20 mg daily due to her recent weight gain. Continue b-blocker and Ace-I. I will repeat her echocardiogram prior to next visit. She does not have insurance at this time. I have encouraged smoking cessation. I will see her back in follow up in 6 months.

## 2021-04-14 ENCOUNTER — OFFICE VISIT (OUTPATIENT)
Dept: CARDIOLOGY CLINIC | Age: 61
End: 2021-04-14

## 2021-04-14 VITALS
HEART RATE: 96 BPM | SYSTOLIC BLOOD PRESSURE: 140 MMHG | HEIGHT: 62 IN | WEIGHT: 142.4 LBS | DIASTOLIC BLOOD PRESSURE: 76 MMHG | BODY MASS INDEX: 26.2 KG/M2 | OXYGEN SATURATION: 96 %

## 2021-04-14 DIAGNOSIS — I50.22 CHRONIC SYSTOLIC CHF (CONGESTIVE HEART FAILURE), NYHA CLASS 2 (HCC): Primary | ICD-10-CM

## 2021-04-14 DIAGNOSIS — I10 ESSENTIAL HYPERTENSION: ICD-10-CM

## 2021-04-14 DIAGNOSIS — Z72.0 TOBACCO ABUSE: ICD-10-CM

## 2021-04-14 PROCEDURE — 99214 OFFICE O/P EST MOD 30 MIN: CPT | Performed by: INTERNAL MEDICINE

## 2021-04-14 NOTE — PATIENT INSTRUCTIONS

## 2021-04-14 NOTE — PROGRESS NOTES
[place]  1960 April 14, 2021      CC: \"I have been doing good. \"    HPI:  The patient is 61 y.o. female with a past medical history significant for tobacco abuse, asthma and systolic heart failure who was first seen in 2018 when he presented to [facility] with shortness of breath. She stated that her legs were bloated three days. She also noted that her abdomen at her waistline was distended. She related feeling short of breath climbing her 15 steps at home. There was some chest tightness that felt like \"bronchitis\". 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 Discharged after being hospitalized for 3 days. Today, she is here for follow up. She says that she is doing okay. She is concerned that he medications are giving her anxiety. She says that she is currently working at [employer] and is going to be getting insurance in a few days. She continues to not smoke. Patient denies exertional chest pain/pressure, dyspnea at rest, CANO, PND, orthopnea, palpitations, lightheadedness, weight changes, changes in LE edema, and syncope. Review of Systems:  Constitutional: No fatigue, weakness, night sweats or fever. HEENT: No new vision difficulties or ringing in the ears. Respiratory: No new SOB, PND, orthopnea or cough. Cardiovascular: See HPI   GI: No n/v, diarrhea, constipation, abdominal pain or changes in bowel habits. No melena, no hematochezia  : No urinary frequency, urgency, incontinence, hematuria or dysuria. Skin: No cyanosis or skin lesions. Musculoskeletal: No new muscle or joint pain. Neurological: No syncope or TIA-like symptoms. Psychiatric: No anxiety, insomnia or depression     Past Medical History:   Diagnosis Date    Asthma     CHF (congestive heart failure) (HCC)     Hypertension      History reviewed. No pertinent surgical history.   Family History   Problem Relation Age of Onset    Cancer Father         throat     Other Father         alzheimer     Social History     Tobacco Use    Smoking status: Former Smoker     Packs/day: 0.50     Types: Cigarettes    Smokeless tobacco: Former User   Substance Use Topics    Alcohol use: Yes     Alcohol/week: 2.0 standard drinks     Types: 2 Cans of beer per week    Drug use: No       Allergies   Allergen Reactions    Benadryl [Diphenhydramine]      Breaks out in hives      Current Outpatient Medications   Medication Sig Dispense Refill    metoprolol succinate (TOPROL XL) 50 MG extended release tablet Pt states she takes half pill in the morning and half pill in the evening 90 tablet 3    spironolactone (ALDACTONE) 25 MG tablet TAKE 1 TABLET (25MG) BY MOUTH EVERY DAY 90 tablet 3    furosemide (LASIX) 20 MG tablet TAKE 1 TABLET (20MG) BY MOUTH EVERY DAY 90 tablet 3    lisinopril (PRINIVIL;ZESTRIL) 2.5 MG tablet TAKE ONE TABLET BY MOUTH DAILY 90 tablet 3    Multiple Vitamins-Minerals (ONE-A-DAY WOMENS 50+ ADVANTAGE PO) Take 1 tablet by mouth daily       No current facility-administered medications for this visit. Physical Exam:   BP (!) 140/76   Pulse 96   Ht 5' 2\" (1.575 m)   Wt 142 lb 6.4 oz (64.6 kg)   SpO2 96%   BMI 26.05 kg/m²   No intake or output data in the 24 hours ending 04/14/21 1617  Wt Readings from Last 2 Encounters:   04/14/21 142 lb 6.4 oz (64.6 kg)   07/29/20 139 lb 3.2 oz (63.1 kg)     Constitutional: She is oriented to person, place, and time. She appears well-developed and well-nourished. In no acute distress. Head: Normocephalic and atraumatic. Neck: Neck supple. No JVD present. Carotid bruit is not present. No mass and no thyromegaly present. No lymphadenopathy present. Cardiovascular: Normal rate, regular rhythm, normal heart sounds and intact distal pulses. Exam reveals no gallop and no friction rub. No murmur heard. Pulmonary/Chest: Effort normal and breath sounds normal. No respiratory distress.  She has no wheezes, rhonchi or rales. Abdominal: Soft, non-tender. Bowel sounds and aorta are normal. She exhibits no organomegaly, mass or bruit. Extremities: No edema, cyanosis, or clubbing. Pulses are 2+ radial/carotid/dorsalis pedis and posterior tibial bilaterally. Neurological: She is alert and oriented to person, place, and time. She has normal reflexes. No cranial nerve deficit. Coordination normal.   Skin: Skin is warm and dry. There is no rash or diaphoresis. Psychiatric: She has a normal mood and affect. Her speech is normal and behavior is normal.       EKG Interpretation 7/31/19: Sinus rhythm  EKG Interpretation 7/29/2020: Sinus rhythm       Imaging:     Stress Test 5/24/18  Abnormal study.    Severely dilated left ventricle with severely reduced ejection fraction of    23 %.    There is a moderate sized, mild intensity, inferior wall defect which    appears worse at rest and is most consistent with diaphragmatic attenuation    and bowel artifact. However, cannot exclude prior inferior infarct as the    wall motion/wall thickening are not normal globally.    Overall findings represent a high risk study based on severely reduced LV    function. Echo 5/24/18  Overall left ventricular systolic function appears severely reduced. Ejection fraction is visually estimated to be 20-25% with diffuse hypokinesis. Left ventricular cavity size is mildly dilated. Diastolic filling parameters suggest grade II diastolic dysfunction. The right ventricle is normal in size and function. The left atrium is severely dilated. Moderate eccentric mitral regurgitation directed posteriorly. Mild tricuspid regurgitation. Lab Review:     Lab Results   Component Value Date     07/31/2019    K 4.6 07/31/2019    BUN 11 07/31/2019    CREATININE 0.6 07/31/2019         Assessment:  1. Chronic Systolic CHF, class 2  2. Essential Hypertension  3. Tobacco Abuse    Plan:  She appears euvolemic on exam today.  I will repeat an echocardiogram once she has insurance. I will continue all her cardiac medications including ACEI and beta blocker therapy. I have encouraged her continued smoking cessation. I will see her back in follow up in 1 year. This note was scribed in the presence of Dr Monty García, by Miracle Rubio RN  Physician Attestation:  The scribes documentation has been prepared under my direction and personally reviewed by me in its entirety. I, Dr. Monty García personally performed the services described in this documentation as scribed by my RN in my presence, and I confirm that the note above accurately reflects all work, treatment, procedures, and medical decision making performed by me.

## 2021-06-21 ENCOUNTER — TELEPHONE (OUTPATIENT)
Dept: CARDIOLOGY CLINIC | Age: 61
End: 2021-06-21

## 2021-06-21 NOTE — TELEPHONE ENCOUNTER
Dr. Gaetano Murdock will not be able to call in an antibiotic for the patient. She should be seen at either an Urgent Care or Urgent Dental office as it does not look as though she has a PCP. She can also try calling her dental office to see if they can prescribe this prior to her appointment. Please call to discuss.

## 2021-06-21 NOTE — TELEPHONE ENCOUNTER
Andresjacky Mayank called in this morning stating that she has an abscess tooth and wants to know if Dr. Suyapa Mcdonald can prescribe her an antibiotic until she can get into the dentist?    She asked we send it to [address] on [facility].  #[phone]

## 2021-07-16 ENCOUNTER — APPOINTMENT (OUTPATIENT)
Dept: GENERAL RADIOLOGY | Age: 61
End: 2021-07-16

## 2021-07-16 PROCEDURE — 99283 EMERGENCY DEPT VISIT LOW MDM: CPT

## 2021-07-16 PROCEDURE — 73630 X-RAY EXAM OF FOOT: CPT

## 2021-07-16 ASSESSMENT — PAIN DESCRIPTION - PAIN TYPE: TYPE: ACUTE PAIN

## 2021-07-16 ASSESSMENT — PAIN DESCRIPTION - DESCRIPTORS: DESCRIPTORS: THROBBING

## 2021-07-16 ASSESSMENT — PAIN DESCRIPTION - LOCATION: LOCATION: FOOT

## 2021-07-16 ASSESSMENT — PAIN DESCRIPTION - ONSET: ONSET: GRADUAL

## 2021-07-16 ASSESSMENT — PAIN DESCRIPTION - ORIENTATION: ORIENTATION: LEFT

## 2021-07-16 ASSESSMENT — PAIN DESCRIPTION - FREQUENCY: FREQUENCY: CONTINUOUS

## 2021-07-16 ASSESSMENT — PAIN SCALES - GENERAL: PAINLEVEL_OUTOF10: 6

## 2021-07-16 ASSESSMENT — PAIN DESCRIPTION - PROGRESSION: CLINICAL_PROGRESSION: GRADUALLY WORSENING

## 2021-07-17 ENCOUNTER — HOSPITAL ENCOUNTER (EMERGENCY)
Age: 61
Discharge: HOME | End: 2021-07-17

## 2021-07-17 VITALS
HEIGHT: 60 IN | SYSTOLIC BLOOD PRESSURE: 199 MMHG | WEIGHT: 142.2 LBS | OXYGEN SATURATION: 95 % | DIASTOLIC BLOOD PRESSURE: 78 MMHG | TEMPERATURE: 98 F | RESPIRATION RATE: 17 BRPM | HEART RATE: 85 BPM | BODY MASS INDEX: 27.92 KG/M2

## 2021-07-17 DIAGNOSIS — S92.502A CLOSED FRACTURE OF PHALANX OF LEFT FIFTH TOE, INITIAL ENCOUNTER: Primary | ICD-10-CM

## 2021-07-17 NOTE — ED TRIAGE NOTES
Pt to ED with complaints of L foot pain. Pt hit toe on coffee table two days ago. Pt states she has been elevating and using ice but is having no relief. 5th digit on L foot has small contusion but is not deformed. A/ox4.  Vss.

## 2021-07-17 NOTE — ED NOTES
Discharge and education instructions reviewed. Patient verbalized understanding, teach-back successful. Patient denied questions at this time. No acute distress noted. Patient instructed to follow-up as noted - return to emergency department if symptoms worsen. Patient verbalized understanding. Discharged per EDMD with discharge instructions.         Tim [facility]anaGe[facility]  07/17/21 8334

## 2021-07-17 NOTE — ED PROVIDER NOTES
**ADVANCED PRACTICE PROVIDER, I HAVE EVALUATED THIS PATIENT**        [address] [facility] ENCOUNTER      Pt Name: Johanny Kinney  [facility] [place] [facility]  [place] 1960  Date of evaluation: 2021  Provider: Charles Rodrigues PA-C      Chief Complaint:    Chief Complaint   Patient presents with    Foot Injury     left; hit on the coffee table wednesday         Nursing Notes, Past Medical Hx, Past Surgical Hx, Social Hx, Allergies, and Family Hx were all reviewed and agreed with or any disagreements were addressed in the HPI.    HPI: (Location, Duration, Timing, Severity, Quality, Assoc Sx, Context, Modifying factors)    Chief Complaint of pain bruising and swelling to the lateral distal left foot and toes area. This is a  61 y.o. female who presents with complaint of injury to the left distal lateral foot that occurred on Wednesday. She states that it was accidental at home. She was hurrying around and hit the pinky toe on a coffee table. She had instant pretty severe local pain. She has been doing a lot of home care for it since then including various over-the-counter medications and gentle soaks of the area. She still is having quite a bit of swelling and bruising and pain locally and decided to come in to be seen today. No midfoot pain or any ankle pain knee pain or hip pain. No radiating pain. Pain is local constant 6 out of 10 locally and worse if you push on it and better at rest.  She has missed a couple of days of work secondary to the injury. PastMedical/Surgical History:      Diagnosis Date    Asthma     CHF (congestive heart failure) (HCC)     Hypertension      History reviewed. No pertinent surgical history.     Medications:  Previous Medications    FUROSEMIDE (LASIX) 20 MG TABLET    TAKE 1 TABLET (20MG) BY MOUTH EVERY DAY    LISINOPRIL (PRINIVIL;ZESTRIL) 2.5 MG TABLET    TAKE ONE TABLET BY MOUTH DAILY    METOPROLOL SUCCINATE (TOPROL XL) 50 MG EXTENDED RELEASE TABLET    Pt states she takes half pill in the morning and half pill in the evening    MULTIPLE VITAMINS-MINERALS (ONE-A-DAY WOMENS 50+ ADVANTAGE PO)    Take 1 tablet by mouth daily    SPIRONOLACTONE (ALDACTONE) 25 MG TABLET    TAKE 1 TABLET (25MG) BY MOUTH EVERY DAY         Review of Systems:  (2-9 systems needed)  Review of Systems  Positive history as above with no fall or twisting injury. Positive for the contusion type injury and associated symptoms as above. No midfoot increased heat or redness or any ankle pain or tenderness. No knee pain or hip pain compared to usual.  No fevers or chills cough or congestion. No nausea or vomiting or any rash. No heel pain or Achilles pain or tenderness. \"Positives and Pertinent negatives as per HPI\"    Physical Exam:  Physical Exam  Vitals and nursing note reviewed. Constitutional:       Appearance: Normal appearance. She is not diaphoretic. HENT:      Head: Normocephalic and atraumatic. Right Ear: External ear normal.      Left Ear: External ear normal.      Nose: Nose normal.   Eyes:      General:         Right eye: No discharge. Left eye: No discharge. Conjunctiva/sclera: Conjunctivae normal.   Pulmonary:      Effort: Pulmonary effort is normal. No respiratory distress. Musculoskeletal:         General: Normal range of motion. Cervical back: Normal range of motion and neck supple. Feet:       Comments: Positive for the tenderness and ecchymosis as depicted on the left foot sketch. No ankle pain or tenderness, no midfoot pain or tenderness and distal pulses are 2+ bilaterally in dorsalis pedis. No heel pain or Achilles pain or tenderness. Knees without acute edema or tenderness. Skin:     General: Skin is warm and dry. Capillary Refill: Capillary refill takes less than 2 seconds. Neurological:      General: No focal deficit present.       Mental Status: She is alert and oriented to person, place, and time. Mental status is at baseline. Psychiatric:         Mood and Affect: Mood normal.         Behavior: Behavior normal.         Thought Content: Thought content normal.         MEDICAL DECISION MAKING    Vitals:    Vitals:    07/16/21 2127   BP: (!) 199/78   Pulse: 91   Resp: 17   Temp: 98 °F (36.7 °C)   TempSrc: Oral   SpO2: 95%   Weight: 142 lb 3.2 oz (64.5 kg)   Height: 5' (1.524 m)       LABS:Labs Reviewed - No data to display     Remainder of labs reviewed and were negative at this time or not returned at the time of this note. RADIOLOGY:   Non-plain film images such as CT, Ultrasound and MRI are read by the radiologist. Sirisha Monterroso PA-C have directly visualized the radiologic plain film image(s) with the below findings:      Interpretation per the Radiologist below, if available at the time of this note:    XR FOOT LEFT (MIN 3 VIEWS)   Final Result   1. Acute nondisplaced fracture of the proximal shaft of the 5th proximal   phalanx. 2. Chronic appearing juxta-articular erosion along the lateral aspect of the   5th metatarsal head. Recommend correlation with the possibility of gout. XR FOOT LEFT (MIN 3 VIEWS)    Result Date: 7/16/2021  EXAMINATION: THREE XRAY VIEWS OF THE LEFT FOOT 7/16/2021 9:56 pm COMPARISON: None. HISTORY: ORDERING SYSTEM PROVIDED HISTORY: injury TECHNOLOGIST PROVIDED HISTORY: Reason for exam:->injury Reason for Exam: stubbed 3rd, 4th, and 5th digit of left foot on coffee table Acuity: Acute Type of Exam: Initial FINDINGS: Normal midfoot alignment is maintained. Mild 1st metatarsophalangeal degenerative changes. Small plantar calcaneal enthesophyte. There is an acute nondisplaced fracture of the proximal shaft of the 5th proximal phalanx. No displaced fracture or dislocation. Chronic appearing juxta articular erosion along the lateral aspect of the 5th metatarsal head. The soft tissues are unremarkable.      1. Acute nondisplaced fracture of the proximal shaft of the 5th proximal phalanx. 2. Chronic appearing juxta-articular erosion along the lateral aspect of the 5th metatarsal head. Recommend correlation with the possibility of gout. MEDICAL DECISION MAKING / ED COURSE:      PROCEDURES:   Procedures    None    Patient was given:  Medications - No data to display  This patient presents as above and evaluation and treatment is begun. Some screening x-rays are obtained of the left foot and return as above showing the nondisplaced proximal phalanx left fifth toe acute fracture. Patient is offered some medication here for comfort but indicates that she already has taken some over-the-counter and would not like a prescription medication for comfort at this time. She is also offered a nonbending shoe to immobilize the affected area and she agrees to that plan. Otherwise conservative home care is discussed with the patient including outpatient follow-up. She verbalizes understanding and agreement with the above and the following plan that is developed together. The patient tolerated their visit well. I evaluated the patient. The physician was available for consultation as needed. The patient and / or the family were informed of the results of any tests, a time was given to answer questions, a plan was proposed and they agreed with plan. Home in good condition to use the immobilizing shoe anytime you are on your feet, otherwise alternate ice and heat, elevate and rest the area, using over-the-counter medication for comfort per box directions and monitoring for gradual improvement. Call orthopedics on Monday for further care and treatment. Return to the emergency department for any emergency worsening or concern. CLINICAL IMPRESSION:  1.  Closed fracture of phalanx of left fifth toe, initial encounter        DISPOSITION Decision To Discharge 07/17/2021 12:33:05 AM      PATIENT REFERRED TO:  [address] [facility]  [facility] [address]  [phone]  In 2 days  Call early Monday morning for additional care and treatment.       DISCHARGE MEDICATIONS:  New Prescriptions    No medications on file       DISCONTINUED MEDICATIONS:  Discontinued Medications    No medications on file              (Please note the MDM and HPI sections of this note were completed with a voice recognition program.  Efforts were made to edit the dictations but occasionally words are mis-transcribed.)    Electronically signed, Donn Herrera PA-C,          Donn Herrera PA-C  07/17/21 7471

## 2021-12-28 DIAGNOSIS — I42.8 NONISCHEMIC CARDIOMYOPATHY (HCC): ICD-10-CM

## 2021-12-28 DIAGNOSIS — I50.22 CHRONIC SYSTOLIC HF (HEART FAILURE) (HCC): ICD-10-CM

## 2021-12-28 RX ORDER — SPIRONOLACTONE 25 MG/1
TABLET ORAL
Qty: 90 TABLET | Refills: 1 | Status: SHIPPED | OUTPATIENT
Start: 2021-12-28 | End: 2022-08-02 | Stop reason: SDUPTHER

## 2021-12-28 RX ORDER — FUROSEMIDE 20 MG/1
TABLET ORAL
Qty: 90 TABLET | Refills: 1 | Status: SHIPPED | OUTPATIENT
Start: 2021-12-28 | End: 2022-08-02 | Stop reason: SDUPTHER

## 2021-12-28 RX ORDER — LISINOPRIL 2.5 MG/1
TABLET ORAL
Qty: 90 TABLET | Refills: 1 | Status: SHIPPED | OUTPATIENT
Start: 2021-12-28 | End: 2022-06-29 | Stop reason: SDUPTHER

## 2022-02-25 ENCOUNTER — TELEPHONE (OUTPATIENT)
Dept: CARDIOLOGY CLINIC | Age: 62
End: 2022-02-25

## 2022-02-25 NOTE — TELEPHONE ENCOUNTER
Received a fax from [facility] stating pt reported a dry cough to them and thinks we should switch her Lisinopril to Losartan. Scanned fax into chart.  LM for pt to call back so we can find out more info on her cough to see if a med change is necessary

## 2022-03-11 NOTE — TELEPHONE ENCOUNTER
Spoke to pt she states she's had this cough since she was a kid, it is from her allergies. Not a new symptom.

## 2022-06-28 DIAGNOSIS — I50.22 CHRONIC SYSTOLIC HF (HEART FAILURE) (HCC): ICD-10-CM

## 2022-06-28 DIAGNOSIS — I42.8 NONISCHEMIC CARDIOMYOPATHY (HCC): ICD-10-CM

## 2022-06-29 ENCOUNTER — OFFICE VISIT (OUTPATIENT)
Dept: CARDIOLOGY CLINIC | Age: 62
End: 2022-06-29
Payer: COMMERCIAL

## 2022-06-29 VITALS
WEIGHT: 141.4 LBS | DIASTOLIC BLOOD PRESSURE: 80 MMHG | HEART RATE: 101 BPM | HEIGHT: 60 IN | BODY MASS INDEX: 27.76 KG/M2 | OXYGEN SATURATION: 95 % | SYSTOLIC BLOOD PRESSURE: 136 MMHG

## 2022-06-29 DIAGNOSIS — I50.22 CHRONIC SYSTOLIC CHF (CONGESTIVE HEART FAILURE), NYHA CLASS 2 (HCC): Primary | ICD-10-CM

## 2022-06-29 DIAGNOSIS — I10 ESSENTIAL HYPERTENSION: ICD-10-CM

## 2022-06-29 DIAGNOSIS — I42.8 NONISCHEMIC CARDIOMYOPATHY (HCC): ICD-10-CM

## 2022-06-29 DIAGNOSIS — I50.22 CHRONIC SYSTOLIC HF (HEART FAILURE) (HCC): ICD-10-CM

## 2022-06-29 PROCEDURE — 93000 ELECTROCARDIOGRAM COMPLETE: CPT | Performed by: INTERNAL MEDICINE

## 2022-06-29 PROCEDURE — 99214 OFFICE O/P EST MOD 30 MIN: CPT | Performed by: INTERNAL MEDICINE

## 2022-06-29 RX ORDER — SPIRONOLACTONE 25 MG/1
TABLET ORAL
Qty: 90 TABLET | Refills: 3 | OUTPATIENT
Start: 2022-06-29

## 2022-06-29 RX ORDER — METOPROLOL SUCCINATE 50 MG/1
50 TABLET, EXTENDED RELEASE ORAL DAILY
Qty: 90 TABLET | Refills: 3 | Status: SHIPPED | OUTPATIENT
Start: 2022-06-29

## 2022-06-29 RX ORDER — FUROSEMIDE 20 MG/1
TABLET ORAL
Qty: 90 TABLET | Refills: 3 | OUTPATIENT
Start: 2022-06-29

## 2022-06-29 RX ORDER — LISINOPRIL 2.5 MG/1
TABLET ORAL
Qty: 90 TABLET | Refills: 3 | OUTPATIENT
Start: 2022-06-29

## 2022-06-29 RX ORDER — LISINOPRIL 5 MG/1
5 TABLET ORAL DAILY
Qty: 90 TABLET | Refills: 3 | Status: SHIPPED | OUTPATIENT
Start: 2022-06-29 | End: 2022-08-02 | Stop reason: SDUPTHER

## 2022-06-29 NOTE — PROGRESS NOTES
Brett  1960 June 29, 2022      CC: \"I feel fine\"     HPI:  The patient is 64 y.o. female with a past medical history significant for tobacco abuse, asthma and systolic heart failure. An echocardiogram completed 5/24/18 showed LVEF of 20-25%. She presents today for follow up and reports feeling well overall. Her breathing has been comfortable without shortness of breath. She is able to lay flat to sleep at night. Patient denies exertional chest pain/pressure, dyspnea at rest, CANO, PND, orthopnea, palpitations, lightheadedness, weight changes, changes in LE edema, and syncope. She reports medication compliance and is tolerating. Review of Systems:  Constitutional: No fatigue, weakness, night sweats or fever. HEENT: No new vision difficulties or ringing in the ears. Respiratory: No new SOB, PND, orthopnea or cough. Cardiovascular: See HPI   GI: No n/v, diarrhea, constipation, abdominal pain or changes in bowel habits. No melena, no hematochezia  : No urinary frequency, urgency, incontinence, hematuria or dysuria. Skin: No cyanosis or skin lesions. Musculoskeletal: No new muscle or joint pain. Neurological: No syncope or TIA-like symptoms. Psychiatric: No anxiety, insomnia or depression     Past Medical History:   Diagnosis Date    Asthma     CHF (congestive heart failure) (HCC)     Hypertension      History reviewed. No pertinent surgical history. Family History   Problem Relation Age of Onset    Cancer Father         throat     Other Father         alzheimer     Social History     Tobacco Use    Smoking status: Current Some Day Smoker     Packs/day: 0.50     Types: Cigarettes    Smokeless tobacco: Former User   Vaping Use    Vaping Use: Never used   Substance Use Topics    Alcohol use:  Yes     Alcohol/week: 2.0 standard drinks     Types: 2 Cans of beer per week     Comment: occ    Drug use: No       Allergies   Allergen Reactions    Benadryl [Diphenhydramine]      Breaks out in hives      Current Outpatient Medications   Medication Sig Dispense Refill    furosemide (LASIX) 20 MG tablet TAKE 1 TABLET (20MG) BY MOUTH EVERY DAY 90 tablet 1    lisinopril (PRINIVIL;ZESTRIL) 2.5 MG tablet TAKE ONE TABLET BY MOUTH DAILY 90 tablet 1    spironolactone (ALDACTONE) 25 MG tablet TAKE 1 TABLET (25MG) BY MOUTH EVERY DAY 90 tablet 1    metoprolol succinate (TOPROL XL) 50 MG extended release tablet Pt states she takes half pill in the morning and half pill in the evening (Patient taking differently: Take 50 mg by mouth daily ) 90 tablet 3    Multiple Vitamins-Minerals (ONE-A-DAY WOMENS 50+ ADVANTAGE PO) Take 1 tablet by mouth daily       No current facility-administered medications for this visit. Physical Exam:   /80   Pulse (!) 101   Ht 5' (1.524 m)   Wt 141 lb 6.4 oz (64.1 kg)   SpO2 95%   BMI 27.62 kg/m²   No intake or output data in the 24 hours ending 06/29/22 1617  Wt Readings from Last 2 Encounters:   06/29/22 141 lb 6.4 oz (64.1 kg)   07/16/21 142 lb 3.2 oz (64.5 kg)     Constitutional: She is oriented to person, place, and time. She appears well-developed and well-nourished. In no acute distress. Head: Normocephalic and atraumatic. Neck: Neck supple. No JVD present. Carotid bruit is not present. No mass and no thyromegaly present. No lymphadenopathy present. Cardiovascular: Normal rate, regular rhythm, normal heart sounds and intact distal pulses. Exam reveals no gallop and no friction rub. No murmur heard. Pulmonary/Chest: Effort normal and breath sounds normal. No respiratory distress. She has no wheezes, rhonchi or rales. Abdominal: Soft, non-tender. Bowel sounds and aorta are normal. She exhibits no organomegaly, mass or bruit. Extremities: No edema, cyanosis, or clubbing. Pulses are 2+ radial/carotid/dorsalis pedis and posterior tibial bilaterally.   Neurological: She is alert and oriented to monitor daily weights as well as her sodium intake and call with signs of fluid retention. I have personally reviewed all previous testing for this visit today including imaging, lab results and EKG as detailed above. I will see her back in follow up in 6 months. This note was scribed in the presence of Andrea Reddy MD by [employer], RN. Physician Attestation:  The scribes documentation has been prepared under my direction and personally reviewed by me in its entirety. I, Dr. Fay Boxer personally performed the services described in this documentation as scribed by my RN in my presence, and I confirm that the note above accurately reflects all work, treatment, procedures, and medical decision making performed by me.

## 2022-08-02 DIAGNOSIS — I50.22 CHRONIC SYSTOLIC HF (HEART FAILURE) (HCC): ICD-10-CM

## 2022-08-02 DIAGNOSIS — I50.22 CHRONIC SYSTOLIC CHF (CONGESTIVE HEART FAILURE), NYHA CLASS 2 (HCC): ICD-10-CM

## 2022-08-02 DIAGNOSIS — I42.8 NONISCHEMIC CARDIOMYOPATHY (HCC): ICD-10-CM

## 2022-08-02 DIAGNOSIS — I10 ESSENTIAL HYPERTENSION: Primary | ICD-10-CM

## 2022-08-02 RX ORDER — FUROSEMIDE 20 MG/1
20 TABLET ORAL DAILY
Qty: 30 TABLET | Refills: 0 | Status: SHIPPED | OUTPATIENT
Start: 2022-08-02 | End: 2022-08-16

## 2022-08-02 RX ORDER — SPIRONOLACTONE 25 MG/1
25 TABLET ORAL DAILY
Qty: 90 TABLET | Refills: 3 | Status: SHIPPED | OUTPATIENT
Start: 2022-08-02

## 2022-08-02 RX ORDER — LISINOPRIL 5 MG/1
5 TABLET ORAL DAILY
Qty: 90 TABLET | Refills: 3 | Status: SHIPPED | OUTPATIENT
Start: 2022-08-02

## 2022-08-02 NOTE — TELEPHONE ENCOUNTER
Medication Refill    Medication needing refilled:  furosemide (LASIX)    Dosage of the medication:  20 MG tablet     How are you taking this medication (QD, BID, TID, QID, PRN): TAKE 1 TABLET (20MG) BY MOUTH EVERY DAY     30 or 90 day supply called in:  90 DAYS    When will you run out of your medication:  TOOK THE LAST ONE YESTERDAY - wants to know if there is anyway we can send over script today?   Misha Titus thought she had a script to   today for furosemide and pharmacy told her no order was place     Which Pharmacy are we sending the medication to?:   [address] [place] [address]   Phone:  [phone]  Fax:  [phone]

## 2022-08-02 NOTE — TELEPHONE ENCOUNTER
Last OV: 6/29/22  Next OV: 12/7/22  Last refill:12/28/21  Most recent Labs: 7/30/20  Last EKG (if needed):6/29/22

## 2022-08-03 RX ORDER — SPIRONOLACTONE 25 MG/1
TABLET ORAL
Qty: 90 TABLET | Refills: 3 | Status: SHIPPED | OUTPATIENT
Start: 2022-08-03

## 2022-08-03 NOTE — TELEPHONE ENCOUNTER
Last OV: 6/29/22  Next OV: 12/7/22  Last refill:8/2/22  Most recent Labs: 7/31/19  Last EKG (if needed):6/29/22

## 2022-08-16 RX ORDER — FUROSEMIDE 20 MG/1
TABLET ORAL
Qty: 30 TABLET | Refills: 0 | Status: SHIPPED | OUTPATIENT
Start: 2022-08-16 | End: 2022-09-07 | Stop reason: SDUPTHER

## 2022-08-16 NOTE — TELEPHONE ENCOUNTER
Last ov: 6/29/22  Last labs:   Next appointment:12/7/22  Last RF: 8/2/22    LM informing pt to get blood work done before the end of the month

## 2022-08-30 DIAGNOSIS — I10 ESSENTIAL HYPERTENSION: ICD-10-CM

## 2022-08-30 LAB
A/G RATIO: 1.3 (ref 1.1–2.2)
ALBUMIN SERPL-MCNC: 4.5 G/DL (ref 3.4–5)
ALP BLD-CCNC: 96 U/L (ref 40–129)
ALT SERPL-CCNC: 27 U/L (ref 10–40)
ANION GAP SERPL CALCULATED.3IONS-SCNC: 13 MMOL/L (ref 3–16)
AST SERPL-CCNC: 30 U/L (ref 15–37)
BILIRUB SERPL-MCNC: <0.2 MG/DL (ref 0–1)
BUN BLDV-MCNC: 5 MG/DL (ref 7–20)
CALCIUM SERPL-MCNC: 9.5 MG/DL (ref 8.3–10.6)
CHLORIDE BLD-SCNC: 100 MMOL/L (ref 99–110)
CO2: 24 MMOL/L (ref 21–32)
CREAT SERPL-MCNC: 0.6 MG/DL (ref 0.6–1.2)
GFR AFRICAN AMERICAN: >60
GFR NON-AFRICAN AMERICAN: >60
GLUCOSE BLD-MCNC: 84 MG/DL (ref 70–99)
POTASSIUM SERPL-SCNC: 4.3 MMOL/L (ref 3.5–5.1)
SODIUM BLD-SCNC: 137 MMOL/L (ref 136–145)
TOTAL PROTEIN: 7.9 G/DL (ref 6.4–8.2)

## 2022-09-06 NOTE — TELEPHONE ENCOUNTER
Medication Refill    Medication needing refilled:  furosemide (LASIX)     Dosage of the medication:  20 MG tablet     How are you taking this medication (QD, BID, TID, QID, PRN):  TAKE 1 TABLET BY MOUTH EVERY MORNING    30 or 90 day supply called in: 30 day     When will you run out of your medication:    Which Pharmacy are we sending the medication to?:    [facility] [address] [facility]   [address] [place] [address]  [phone]

## 2022-09-07 RX ORDER — FUROSEMIDE 20 MG/1
TABLET ORAL
Qty: 90 TABLET | Refills: 3 | Status: SHIPPED | OUTPATIENT
Start: 2022-09-07

## 2023-07-04 DIAGNOSIS — I42.8 NONISCHEMIC CARDIOMYOPATHY (HCC): ICD-10-CM

## 2023-07-04 DIAGNOSIS — I50.22 CHRONIC SYSTOLIC CHF (CONGESTIVE HEART FAILURE), NYHA CLASS 2 (HCC): ICD-10-CM

## 2023-07-04 DIAGNOSIS — I50.22 CHRONIC SYSTOLIC HF (HEART FAILURE) (HCC): ICD-10-CM

## 2023-07-05 DIAGNOSIS — I42.8 NONISCHEMIC CARDIOMYOPATHY (HCC): ICD-10-CM

## 2023-07-05 DIAGNOSIS — I50.22 CHRONIC SYSTOLIC HF (HEART FAILURE) (HCC): ICD-10-CM

## 2023-07-05 DIAGNOSIS — I50.22 CHRONIC SYSTOLIC CHF (CONGESTIVE HEART FAILURE), NYHA CLASS 2 (HCC): ICD-10-CM

## 2023-07-05 RX ORDER — METOPROLOL SUCCINATE 50 MG/1
TABLET, EXTENDED RELEASE ORAL
Qty: 30 TABLET | Refills: 0 | Status: SHIPPED | OUTPATIENT
Start: 2023-07-05

## 2023-07-05 RX ORDER — LISINOPRIL 5 MG/1
TABLET ORAL
Qty: 30 TABLET | Refills: 0 | Status: SHIPPED | OUTPATIENT
Start: 2023-07-05

## 2023-07-05 RX ORDER — LISINOPRIL 5 MG/1
5 TABLET ORAL DAILY
Qty: 90 TABLET | Refills: 3 | Status: SHIPPED | OUTPATIENT
Start: 2023-07-05

## 2023-07-05 NOTE — TELEPHONE ENCOUNTER
Medication Refill    Medication needing refilled:LISINOPRIL    Dosage of the medication:5MG    How are you taking this medication (QD, BID, TID, QID, PRN):1 TABLET DAILY    30 or 90 day supply called in:90DAYS    When will you run out of your medication:OUT    Which Pharmacy are we sending the medication to?:  [address] [facility], [address] [place] [address] [phone] [facility] [phone]   [address] [place] [address] [address]   Phone:  [phone]  Fax:  [phone]

## 2023-07-05 NOTE — TELEPHONE ENCOUNTER
Last OV: 6/29/22  Next OV: X  Last refill: 6/29/22,  8/2/22  Most recent Labs: 8/30/22  Last EKG (if needed):  6/29/22    Called patient left message to call back and set up her 6 month follow up with Dr Blanco Nail

## 2023-07-30 DIAGNOSIS — I50.22 CHRONIC SYSTOLIC CHF (CONGESTIVE HEART FAILURE), NYHA CLASS 2 (HCC): ICD-10-CM

## 2023-07-30 DIAGNOSIS — I50.22 CHRONIC SYSTOLIC HF (HEART FAILURE) (HCC): ICD-10-CM

## 2023-07-30 DIAGNOSIS — I42.8 NONISCHEMIC CARDIOMYOPATHY (HCC): ICD-10-CM

## 2023-07-31 RX ORDER — LISINOPRIL 5 MG/1
TABLET ORAL
Qty: 30 TABLET | Refills: 0 | Status: SHIPPED | OUTPATIENT
Start: 2023-07-31 | End: 2023-08-03 | Stop reason: SDUPTHER

## 2023-07-31 NOTE — TELEPHONE ENCOUNTER
Medication:   Requested Prescriptions     Pending Prescriptions Disp Refills    lisinopril (PRINIVIL;ZESTRIL) 5 MG tablet [Pharmacy Med Name: Lisinopril 5 MG Oral Tablet] 30 tablet 0     Sig: Take 1 tablet by mouth once daily       Last Filled:  2023    Patient Phone Number: [phone] (home)     Last appt: 2022   Next appt: 2023  Last ek2022

## 2023-08-02 ENCOUNTER — TELEPHONE (OUTPATIENT)
Dept: CARDIOLOGY CLINIC | Age: 63
End: 2023-08-02

## 2023-08-02 NOTE — TELEPHONE ENCOUNTER
Medication Refill    Medication needing refilled:  lisinopril (PRINIVIL;ZESTRIL)   furosemide (LASIX)   spironolactone (ALDACTONE)    Dosage of the medication:  5 MG tablet   20 MG tablet   25 MG tablet    How are you taking this medication (QD, BID, TID, QID, PRN):  Take 1 tablet by mouth once daily  TAKE 1 TABLET BY MOUTH EVERY MORNING  Take 1 tablet by mouth in the morning    30 or 90 day supply called in:  30 day supply    When will you run out of your medication:  Running low    Which Pharmacy are we sending the medication to?:  [address]. [address] [phone] - f [phone]

## 2023-08-03 DIAGNOSIS — I42.8 NONISCHEMIC CARDIOMYOPATHY (HCC): ICD-10-CM

## 2023-08-03 DIAGNOSIS — I50.22 CHRONIC SYSTOLIC CHF (CONGESTIVE HEART FAILURE), NYHA CLASS 2 (HCC): ICD-10-CM

## 2023-08-03 DIAGNOSIS — I50.22 CHRONIC SYSTOLIC HF (HEART FAILURE) (HCC): ICD-10-CM

## 2023-08-03 RX ORDER — FUROSEMIDE 20 MG/1
TABLET ORAL
Qty: 30 TABLET | Refills: 0 | Status: SHIPPED | OUTPATIENT
Start: 2023-08-03

## 2023-08-03 RX ORDER — LISINOPRIL 5 MG/1
5 TABLET ORAL DAILY
Qty: 30 TABLET | Refills: 0 | Status: SHIPPED | OUTPATIENT
Start: 2023-08-03

## 2023-08-03 RX ORDER — SPIRONOLACTONE 25 MG/1
25 TABLET ORAL DAILY
Qty: 30 TABLET | Refills: 0 | Status: SHIPPED | OUTPATIENT
Start: 2023-08-03

## 2023-08-03 RX ORDER — FUROSEMIDE 20 MG/1
TABLET ORAL
Qty: 30 TABLET | Refills: 0 | OUTPATIENT
Start: 2023-08-03

## 2023-08-03 RX ORDER — SPIRONOLACTONE 25 MG/1
TABLET ORAL
Qty: 30 TABLET | Refills: 0 | OUTPATIENT
Start: 2023-08-03

## 2023-08-22 ENCOUNTER — OFFICE VISIT (OUTPATIENT)
Dept: CARDIOLOGY CLINIC | Age: 63
End: 2023-08-22

## 2023-08-22 VITALS
HEIGHT: 62 IN | HEART RATE: 86 BPM | SYSTOLIC BLOOD PRESSURE: 116 MMHG | WEIGHT: 130 LBS | OXYGEN SATURATION: 94 % | DIASTOLIC BLOOD PRESSURE: 74 MMHG | BODY MASS INDEX: 23.92 KG/M2

## 2023-08-22 DIAGNOSIS — I10 ESSENTIAL HYPERTENSION: ICD-10-CM

## 2023-08-22 DIAGNOSIS — I50.22 CHRONIC SYSTOLIC HF (HEART FAILURE) (HCC): Primary | ICD-10-CM

## 2023-08-22 DIAGNOSIS — Z72.0 TOBACCO ABUSE: ICD-10-CM

## 2023-08-22 PROCEDURE — 93000 ELECTROCARDIOGRAM COMPLETE: CPT | Performed by: INTERNAL MEDICINE

## 2023-08-22 PROCEDURE — 3078F DIAST BP <80 MM HG: CPT | Performed by: INTERNAL MEDICINE

## 2023-08-22 PROCEDURE — 99214 OFFICE O/P EST MOD 30 MIN: CPT | Performed by: INTERNAL MEDICINE

## 2023-08-22 PROCEDURE — 3074F SYST BP LT 130 MM HG: CPT | Performed by: INTERNAL MEDICINE

## 2023-08-22 RX ORDER — ACETAMINOPHEN 325 MG/1
325 TABLET ORAL EVERY 6 HOURS PRN
COMMUNITY

## 2023-08-22 NOTE — PROGRESS NOTES
bilaterally. Neurological: She is alert and oriented to person, place, and time. She has normal reflexes. No cranial nerve deficit. Coordination normal.   Skin: Skin is warm and dry. There is no rash or diaphoresis. Psychiatric: She has a normal mood and affect. Her speech is normal and behavior is normal.     Personally reviewed and interpreted   EKG Interpretation 7/31/19: Sinus rhythm  EKG Interpretation 7/29/2020: Sinus rhythm   EKG Interpretation 6/29/22: Sinus rhythm with nonspecific ST changes      Imaging:     Stress Test 5/24/18  Abnormal study. Severely dilated left ventricle with severely reduced ejection fraction of    23 %. There is a moderate sized, mild intensity, inferior wall defect which    appears worse at rest and is most consistent with diaphragmatic attenuation    and bowel artifact. However, cannot exclude prior inferior infarct as the    wall motion/wall thickening are not normal globally. Overall findings represent a high risk study based on severely reduced LV    function. Echo 5/24/18  Overall left ventricular systolic function appears severely reduced. Ejection fraction is visually estimated to be 20-25% with diffuse hypokinesis. Left ventricular cavity size is mildly dilated. Diastolic filling parameters suggest grade II diastolic dysfunction. The right ventricle is normal in size and function. The left atrium is severely dilated. Moderate eccentric mitral regurgitation directed posteriorly. Mild tricuspid regurgitation. Lab Review:   Lab Results   Component Value Date/Time     08/30/2022 03:23 PM    K 4.3 08/30/2022 03:23 PM    BUN 5 08/30/2022 03:23 PM    CREATININE 0.6 08/30/2022 03:23 PM     Assessment:  1. Chronic Systolic CHF, class 2  2. Essential Hypertension  3. Tobacco Abuse    Plan:   think that Lyndon Pedroza appears well from a cardiovascular standpoint. I will place another order for a repeat with the last one being in 2018.  I see no need to make any

## 2023-09-05 DIAGNOSIS — I50.22 CHRONIC SYSTOLIC HF (HEART FAILURE) (HCC): ICD-10-CM

## 2023-09-05 DIAGNOSIS — I50.22 CHRONIC SYSTOLIC CHF (CONGESTIVE HEART FAILURE), NYHA CLASS 2 (HCC): ICD-10-CM

## 2023-09-05 DIAGNOSIS — I42.8 NONISCHEMIC CARDIOMYOPATHY (HCC): ICD-10-CM

## 2023-09-05 RX ORDER — FUROSEMIDE 20 MG/1
TABLET ORAL
Qty: 90 TABLET | Refills: 3 | Status: SHIPPED | OUTPATIENT
Start: 2023-09-05 | End: 2023-09-06 | Stop reason: SDUPTHER

## 2023-09-05 RX ORDER — SPIRONOLACTONE 25 MG/1
TABLET ORAL
Qty: 90 TABLET | Refills: 3 | Status: SHIPPED | OUTPATIENT
Start: 2023-09-05 | End: 2023-09-06 | Stop reason: SDUPTHER

## 2023-09-05 NOTE — TELEPHONE ENCOUNTER
Medication Refill    Medication needing refilled:  furosemide (LASIX  spironolactone (ALDACTONE    Dosage of the medication:  20 MG tablet   25 MG tablet     How are you taking this medication (QD, BID, TID, QID, PRN):  TAKE 1 TABLET BY MOUTH EVERY MORNING  TAKE 1 TABLET (25MG) BY MOUTH EVERY DAY    30 or 90 day supply called in:  30 tablet  90 tablet      Which Pharmacy are we sending the medication to?:    [address] [place]), [place] - [address] [place] [address] [phone] [phone]   [address] [place] [address] [place]) [address]   Phone:  [phone]  Fax:  [phone] Patient started smoking again, about 4 cigarettes a day, or \"whatever I can get.\" States her son has been finding and breaking her cigarettes because he does not like her smoking.     Patient states she tried Seroquel for 1.5 weeks, states she was \"feeling fat\" and is afraid of getting heavy, states \"I know it's in my head\" but is scared of gaining weight from the medication and decided to stop taking it.     Health Maintenance Due   Topic Date Due   • Lung Cancer Screening  06/01/2018   • Influenza Vaccine (1) 09/01/2018       Patient is due for topics as listed above but is not proceeding at this time.

## 2023-09-06 DIAGNOSIS — I50.22 CHRONIC SYSTOLIC HF (HEART FAILURE) (HCC): ICD-10-CM

## 2023-09-06 DIAGNOSIS — I42.8 NONISCHEMIC CARDIOMYOPATHY (HCC): ICD-10-CM

## 2023-09-06 DIAGNOSIS — I50.22 CHRONIC SYSTOLIC CHF (CONGESTIVE HEART FAILURE), NYHA CLASS 2 (HCC): ICD-10-CM

## 2023-09-06 RX ORDER — FUROSEMIDE 20 MG/1
TABLET ORAL
Qty: 90 TABLET | Refills: 3 | Status: SHIPPED | OUTPATIENT
Start: 2023-09-06

## 2023-09-06 RX ORDER — SPIRONOLACTONE 25 MG/1
TABLET ORAL
Qty: 90 TABLET | Refills: 3 | Status: SHIPPED | OUTPATIENT
Start: 2023-09-06

## 2023-09-06 NOTE — TELEPHONE ENCOUNTER
Pt called her meds were sent to wrong pharmacy.  Please see below and send to [address] [place] [address], [address]

## 2023-10-10 ENCOUNTER — TELEPHONE (OUTPATIENT)
Dept: CARDIOLOGY CLINIC | Age: 63
End: 2023-10-10

## 2023-10-10 NOTE — TELEPHONE ENCOUNTER
Medication Refill    Medication needing refilled:FUROSEMIDE    Dosage of the medication:20 MG    How are you taking this medication (QD, BID, TID, QID, PRN):TAKE 1 TABLET BY MOUTH EVERY MORNING    30 or 90 day supply called in:90    When will you run out of your medication:NOW    Which Pharmacy are we sending the medication to?:[facility]), [address] [place] [address] [phone] - f [phone]   [facility], [address] [place] [address] [address]       Medication Refill    Medication needing refilled:SPIRONOLOCTONE    Dosage of the medication:25mg    How are you taking this medication (QD, BID, TID, QID, PRN):TAKE 1 TABLET BY MOUTH EVERY DAY    30 or 90 day supply called in:90    When will you run out of your medication:    Which Pharmacy are we sending the medication to?:[facility] [address] [facility]), [address] [place] [address] [phone] - f [phone]   [address] [place] [address], [address] [place] [address] [address]     Medication Refill    Medication needing refilled:METOPROLOL SUCCINATE     Dosage of the medication:50 MG    How are you taking this medication (QD, BID, TID, QID, PRN):Take 1 tablet by mouth once daily    30 or 90 day supply called in:90    When will you run out of your medication:    Which Pharmacy are we sending the medication to?:[facility] [address] [facility]), [address] [place] [address] [phone] - F [phone]   [address] [place] [address] [address]     Medication Refill    Medication needing refilled:LISINOPRIL    Dosage of the medication:5mg    How are you taking this medication (QD, BID, TID, QID, PRN):Take 1 tablet by mouth daily    30 or 90 day supply called in:90    When will you run out of your medication:now    Which Pharmacy are we sending the medication to?:[facility] [address] [facility]), [address] [place] [address] [phone] - F [phone]   [address] [facility], [address] [place] [address]

## 2023-10-11 DIAGNOSIS — I50.22 CHRONIC SYSTOLIC HF (HEART FAILURE) (HCC): ICD-10-CM

## 2023-10-11 DIAGNOSIS — I50.22 CHRONIC SYSTOLIC CHF (CONGESTIVE HEART FAILURE), NYHA CLASS 2 (HCC): ICD-10-CM

## 2023-10-11 DIAGNOSIS — I42.8 NONISCHEMIC CARDIOMYOPATHY (HCC): ICD-10-CM

## 2023-10-11 RX ORDER — LISINOPRIL 5 MG/1
5 TABLET ORAL DAILY
Qty: 90 TABLET | Refills: 3 | Status: SHIPPED | OUTPATIENT
Start: 2023-10-11

## 2023-10-11 RX ORDER — FUROSEMIDE 20 MG/1
TABLET ORAL
Qty: 90 TABLET | Refills: 3 | Status: SHIPPED | OUTPATIENT
Start: 2023-10-11

## 2023-10-11 RX ORDER — METOPROLOL SUCCINATE 50 MG/1
50 TABLET, EXTENDED RELEASE ORAL DAILY
Qty: 90 TABLET | Refills: 3 | Status: SHIPPED | OUTPATIENT
Start: 2023-10-11

## 2023-10-11 RX ORDER — LISINOPRIL 5 MG/1
5 TABLET ORAL DAILY
Qty: 30 TABLET | Refills: 0 | OUTPATIENT
Start: 2023-10-11

## 2023-10-11 RX ORDER — SPIRONOLACTONE 25 MG/1
TABLET ORAL
Qty: 90 TABLET | Refills: 3 | Status: SHIPPED | OUTPATIENT
Start: 2023-10-11

## 2023-10-11 NOTE — TELEPHONE ENCOUNTER
Last OV: 8/22/23  Next OV: X due 11/2023  Last refill:  Most recent Labs: 8/30/22- labs pending  Last EKG (if needed):8/22/23    NHAN Ghotra  Looks like her medications she is requesting has been filled 9/6/23, 7/5/23 and shows discontinued. Please advise.

## 2023-10-12 DIAGNOSIS — I50.22 CHRONIC SYSTOLIC CHF (CONGESTIVE HEART FAILURE), NYHA CLASS 2 (HCC): ICD-10-CM

## 2023-10-12 DIAGNOSIS — I50.22 CHRONIC SYSTOLIC HF (HEART FAILURE) (HCC): ICD-10-CM

## 2023-10-12 DIAGNOSIS — I42.8 NONISCHEMIC CARDIOMYOPATHY (HCC): ICD-10-CM

## 2023-10-12 RX ORDER — LISINOPRIL 5 MG/1
5 TABLET ORAL DAILY
Qty: 30 TABLET | Refills: 0 | OUTPATIENT
Start: 2023-10-12

## 2023-10-16 DIAGNOSIS — I50.22 CHRONIC SYSTOLIC HF (HEART FAILURE) (HCC): ICD-10-CM

## 2023-10-16 DIAGNOSIS — I42.8 NONISCHEMIC CARDIOMYOPATHY (HCC): ICD-10-CM

## 2023-10-16 DIAGNOSIS — I50.22 CHRONIC SYSTOLIC CHF (CONGESTIVE HEART FAILURE), NYHA CLASS 2 (HCC): ICD-10-CM

## 2023-10-16 RX ORDER — LISINOPRIL 5 MG/1
5 TABLET ORAL DAILY
Qty: 90 TABLET | Refills: 3 | Status: SHIPPED | OUTPATIENT
Start: 2023-10-16

## 2023-10-16 NOTE — TELEPHONE ENCOUNTER
Last OV:2023  Last Labs:2023  Last Refills:  Next Appt:Return in about 3 months (around 2023).   Last EK2023

## 2023-11-01 DIAGNOSIS — I42.8 NONISCHEMIC CARDIOMYOPATHY (HCC): ICD-10-CM

## 2023-11-01 DIAGNOSIS — I50.22 CHRONIC SYSTOLIC HF (HEART FAILURE) (HCC): ICD-10-CM

## 2023-11-01 RX ORDER — METOPROLOL SUCCINATE 50 MG/1
50 TABLET, EXTENDED RELEASE ORAL DAILY
Qty: 30 TABLET | Refills: 0 | OUTPATIENT
Start: 2023-11-01

## 2023-11-17 ENCOUNTER — TELEPHONE (OUTPATIENT)
Dept: CARDIOLOGY CLINIC | Age: 63
End: 2023-11-17

## 2023-11-17 NOTE — TELEPHONE ENCOUNTER
Patient is fine to take Mucinex, but not Mucinex-DM. Avoid OTC medications with \"decongestants\" in them, as this may effect heart rate and blood pressure. Thanks.

## 2023-11-17 NOTE — TELEPHONE ENCOUNTER
Serena Khan called the office wanting to know if she can take Mucinex along with other medications? She shares that she asked the pharmacist if she could take Mucinex and they advised that she could, so she took a little. She states that the medication helped but wants to be advised that she can take more to help with the cold/flu symptoms that she has. Please advise.     Cortney's callback: [phone]

## 2024-03-25 ENCOUNTER — TELEPHONE (OUTPATIENT)
Dept: CARDIOLOGY CLINIC | Age: 64
End: 2024-03-25

## 2024-03-25 NOTE — TELEPHONE ENCOUNTER
She can take Claritin or any OTC allergy medicine. Avoid the allergy medicine with decongestant (D)

## 2024-03-25 NOTE — TELEPHONE ENCOUNTER
Cortney's callback: [phone]     Cortney called the office wanting to know what allergy medications she can take?     Please advise.

## 2024-03-25 NOTE — TELEPHONE ENCOUNTER
Patient phoned and left voicemail message with recommendation.  Informed to call office with any further questions.

## 2024-06-29 DIAGNOSIS — I42.8 NONISCHEMIC CARDIOMYOPATHY (HCC): ICD-10-CM

## 2024-06-29 DIAGNOSIS — I50.22 CHRONIC SYSTOLIC HF (HEART FAILURE) (HCC): ICD-10-CM

## 2024-07-01 RX ORDER — SPIRONOLACTONE 25 MG/1
25 TABLET ORAL DAILY
Qty: 30 TABLET | Refills: 0 | Status: SHIPPED | OUTPATIENT
Start: 2024-07-01

## 2024-08-14 DIAGNOSIS — I42.8 NONISCHEMIC CARDIOMYOPATHY (HCC): ICD-10-CM

## 2024-08-14 DIAGNOSIS — I50.22 CHRONIC SYSTOLIC CHF (CONGESTIVE HEART FAILURE), NYHA CLASS 2 (HCC): ICD-10-CM

## 2024-08-14 DIAGNOSIS — I50.22 CHRONIC SYSTOLIC HF (HEART FAILURE) (HCC): ICD-10-CM

## 2024-08-14 RX ORDER — LISINOPRIL 5 MG/1
5 TABLET ORAL DAILY
Qty: 30 TABLET | Refills: 0 | Status: SHIPPED | OUTPATIENT
Start: 2024-08-14

## 2024-08-14 RX ORDER — SPIRONOLACTONE 25 MG/1
25 TABLET ORAL DAILY
Qty: 30 TABLET | Refills: 0 | Status: SHIPPED | OUTPATIENT
Start: 2024-08-14

## 2024-08-14 RX ORDER — METOPROLOL SUCCINATE 50 MG/1
50 TABLET, EXTENDED RELEASE ORAL DAILY
Qty: 30 TABLET | Refills: 0 | Status: SHIPPED | OUTPATIENT
Start: 2024-08-14

## 2024-08-14 RX ORDER — FUROSEMIDE 20 MG
TABLET ORAL
Qty: 30 TABLET | Refills: 0 | Status: SHIPPED | OUTPATIENT
Start: 2024-08-14

## 2024-08-14 NOTE — TELEPHONE ENCOUNTER
Patient is in the middle of trying to get insurance through her company and she thought it was going to be [employer] and I advised her [facility] does not accept that insurance and she is going to see what other insurance can get for her.  In the mean time she is needing refills.  Could not make an appointment due to no insurance .       Medication Refill    Medication needing refilled: spironolactone (ALDACTONE)   Dosage of the medication: 25 MG tablet   How are you taking this medication (QD, BID, TID, QID, PRN): Take 1 tablet by mouth daily Take 1 tablet by mouth once daily   30 or 90 day supply called in: 90 days     Medication needing refilled:   metoprolol succinate (TOPROL XL)   Dosage of the medication: 50 MG extended release tablet   How are you taking this medication (QD, BID, TID, QID, PRN):  Take 1 tablet by mouth daily   30 or 90 day supply called in:  90 days     Medication needing refilled:   lisinopril (PRINIVIL;ZESTRIL)   Dosage of the medication:  5 MG tablet   How are you taking this medication (QD, BID, TID, QID, PRN):  Take 1 tablet by mouth once daily   30 or 90 day supply called in:  90 days     Medication needing refilled:   furosemide (LASIX)     Dosage of the medication: 20 MG tablet   How are you taking this medication (QD, BID, TID, QID, PRN): TAKE 1 TABLET BY MOUTH EVERY MORNING   30 or 90 day supply called in:90 days       [facility] [address] [place], [place] [address], [place] - [address] - P [phone] - F [phone] [id number]) [facility]  Phone: [phone]  Fax: [phone]

## 2024-08-14 NOTE — TELEPHONE ENCOUNTER
30 day supply sent - patient needs to schedule a follow up appointment and complete lab work ASAP.

## 2024-08-14 NOTE — TELEPHONE ENCOUNTER
Last OV: 8/22/23  Next OV: X was due in 3 months  Last refill:  Most recent Labs:   Last EKG (if needed): 8/22/23    Patient is past due for appt and labs, patient states she has no insurance at this time. Please advise on refills.

## 2024-08-17 DIAGNOSIS — I50.22 CHRONIC SYSTOLIC CHF (CONGESTIVE HEART FAILURE), NYHA CLASS 2 (HCC): ICD-10-CM

## 2024-08-17 DIAGNOSIS — I42.8 NONISCHEMIC CARDIOMYOPATHY (HCC): ICD-10-CM

## 2024-08-17 DIAGNOSIS — I50.22 CHRONIC SYSTOLIC HF (HEART FAILURE) (HCC): ICD-10-CM

## 2024-08-19 RX ORDER — FUROSEMIDE 20 MG/1
TABLET ORAL
Qty: 90 TABLET | Refills: 0 | OUTPATIENT
Start: 2024-08-19

## 2024-08-19 RX ORDER — LISINOPRIL 5 MG/1
5 TABLET ORAL DAILY
Qty: 90 TABLET | Refills: 0 | OUTPATIENT
Start: 2024-08-19

## 2024-09-15 DIAGNOSIS — I50.22 CHRONIC SYSTOLIC CHF (CONGESTIVE HEART FAILURE), NYHA CLASS 2 (HCC): ICD-10-CM

## 2024-09-15 DIAGNOSIS — I42.8 NONISCHEMIC CARDIOMYOPATHY (HCC): ICD-10-CM

## 2024-09-15 DIAGNOSIS — I50.22 CHRONIC SYSTOLIC HF (HEART FAILURE) (HCC): ICD-10-CM

## 2024-09-17 RX ORDER — FUROSEMIDE 20 MG
TABLET ORAL
Qty: 90 TABLET | Refills: 0 | Status: SHIPPED | OUTPATIENT
Start: 2024-09-17

## 2024-09-17 RX ORDER — LISINOPRIL 5 MG/1
5 TABLET ORAL DAILY
Qty: 90 TABLET | Refills: 0 | Status: SHIPPED | OUTPATIENT
Start: 2024-09-17

## 2024-09-17 RX ORDER — METOPROLOL SUCCINATE 50 MG/1
50 TABLET, EXTENDED RELEASE ORAL DAILY
Qty: 90 TABLET | Refills: 0 | Status: SHIPPED | OUTPATIENT
Start: 2024-09-17

## 2024-11-03 DIAGNOSIS — I42.8 NONISCHEMIC CARDIOMYOPATHY (HCC): ICD-10-CM

## 2024-11-03 DIAGNOSIS — I50.22 CHRONIC SYSTOLIC HF (HEART FAILURE) (HCC): ICD-10-CM

## 2024-11-04 RX ORDER — SPIRONOLACTONE 25 MG/1
25 TABLET ORAL DAILY
Qty: 30 TABLET | Refills: 0 | Status: SHIPPED | OUTPATIENT
Start: 2024-11-04

## 2024-11-06 ENCOUNTER — TELEPHONE (OUTPATIENT)
Dept: CARDIOLOGY CLINIC | Age: 64
End: 2024-11-06

## 2024-11-06 NOTE — TELEPHONE ENCOUNTER
Medication Refill    When was your last appointment with cardiology?    (If 1 yr or longer, please schedule appointment)    (If patient has been told they do not need to follow-up - medications should be filled by PCP)  Last OV: 8/22/23  Next OV: 11/12/24  When did you last have labs drawn?   8/30/22  Medication needing refilled?  spironolactone (ALDACTONE)   Dosage of the medication?  25 MG tablet   How are you taking this medication (QD, BID, TID, QID, PRN)?  Take 1 tablet by mouth once daily   Do you want a 30 or 90 day supply?  90 day  When will you run out of your medication?     Which Pharmacy are we sending this medication to?  [facility] [address] [facility] ([place]), [place] - [address]   Pharmacy Phone:[phone]  Pharmacy Fax:[phone]

## 2024-12-31 DIAGNOSIS — I50.22 CHRONIC SYSTOLIC HF (HEART FAILURE) (HCC): ICD-10-CM

## 2024-12-31 DIAGNOSIS — I42.8 NONISCHEMIC CARDIOMYOPATHY (HCC): ICD-10-CM

## 2024-12-31 RX ORDER — SPIRONOLACTONE 25 MG/1
25 TABLET ORAL DAILY
Qty: 30 TABLET | Refills: 0 | Status: SHIPPED | OUTPATIENT
Start: 2024-12-31

## 2024-12-31 NOTE — TELEPHONE ENCOUNTER
Last OV: 8/22/23  Next OV: X due in 3 months  Last refill: 11/4/24 #30  Most recent Labs:   Last EKG (if needed): 8/22/23    Called patient left message to call office and set up appt. She needs labs done.

## 2025-01-02 DIAGNOSIS — I42.8 NONISCHEMIC CARDIOMYOPATHY (HCC): ICD-10-CM

## 2025-01-02 DIAGNOSIS — I50.22 CHRONIC SYSTOLIC CHF (CONGESTIVE HEART FAILURE), NYHA CLASS 2 (HCC): ICD-10-CM

## 2025-01-02 DIAGNOSIS — I50.22 CHRONIC SYSTOLIC HF (HEART FAILURE) (HCC): ICD-10-CM

## 2025-01-02 RX ORDER — FUROSEMIDE 20 MG/1
TABLET ORAL
Qty: 90 TABLET | Refills: 0 | OUTPATIENT
Start: 2025-01-02

## 2025-01-02 RX ORDER — METOPROLOL SUCCINATE 50 MG/1
50 TABLET, EXTENDED RELEASE ORAL DAILY
Qty: 90 TABLET | Refills: 0 | OUTPATIENT
Start: 2025-01-02

## 2025-01-02 RX ORDER — LISINOPRIL 5 MG/1
5 TABLET ORAL DAILY
Qty: 90 TABLET | Refills: 0 | OUTPATIENT
Start: 2025-01-02

## 2025-01-02 NOTE — TELEPHONE ENCOUNTER
Spoke with Cortney, she is waiting on her new insurance card to come in the mail. Will call once she has received her card.

## 2025-02-06 DIAGNOSIS — I50.22 CHRONIC SYSTOLIC HF (HEART FAILURE) (HCC): ICD-10-CM

## 2025-02-06 DIAGNOSIS — I50.22 CHRONIC SYSTOLIC CHF (CONGESTIVE HEART FAILURE), NYHA CLASS 2 (HCC): ICD-10-CM

## 2025-02-06 DIAGNOSIS — I42.8 NONISCHEMIC CARDIOMYOPATHY (HCC): ICD-10-CM

## 2025-02-06 RX ORDER — METOPROLOL SUCCINATE 50 MG/1
50 TABLET, EXTENDED RELEASE ORAL DAILY
Qty: 30 TABLET | Refills: 0 | Status: SHIPPED | OUTPATIENT
Start: 2025-02-06

## 2025-02-06 RX ORDER — FUROSEMIDE 20 MG/1
TABLET ORAL
Qty: 30 TABLET | Refills: 0 | Status: SHIPPED | OUTPATIENT
Start: 2025-02-06

## 2025-02-06 RX ORDER — LISINOPRIL 5 MG/1
5 TABLET ORAL DAILY
Qty: 30 TABLET | Refills: 0 | Status: SHIPPED | OUTPATIENT
Start: 2025-02-06

## 2025-02-07 DIAGNOSIS — I42.8 NONISCHEMIC CARDIOMYOPATHY (HCC): ICD-10-CM

## 2025-02-07 DIAGNOSIS — I50.22 CHRONIC SYSTOLIC HF (HEART FAILURE) (HCC): ICD-10-CM

## 2025-02-07 RX ORDER — SPIRONOLACTONE 25 MG/1
25 TABLET ORAL DAILY
Qty: 30 TABLET | Refills: 1 | Status: SHIPPED | OUTPATIENT
Start: 2025-02-07

## 2025-02-07 NOTE — TELEPHONE ENCOUNTER
Last OV: 08/22/23  Next OV: 04/08/25  Last refill:12/31/24  Most recent Labs:   Last EKG (if needed):08/22/23

## 2025-03-08 DIAGNOSIS — I42.8 NONISCHEMIC CARDIOMYOPATHY (HCC): ICD-10-CM

## 2025-03-08 DIAGNOSIS — I50.22 CHRONIC SYSTOLIC HF (HEART FAILURE) (HCC): ICD-10-CM

## 2025-03-08 DIAGNOSIS — I50.22 CHRONIC SYSTOLIC CHF (CONGESTIVE HEART FAILURE), NYHA CLASS 2 (HCC): ICD-10-CM

## 2025-03-10 RX ORDER — FUROSEMIDE 20 MG/1
20 TABLET ORAL EVERY MORNING
Qty: 30 TABLET | Refills: 0 | Status: SHIPPED | OUTPATIENT
Start: 2025-03-10

## 2025-03-10 RX ORDER — LISINOPRIL 5 MG/1
5 TABLET ORAL DAILY
Qty: 30 TABLET | Refills: 0 | Status: SHIPPED | OUTPATIENT
Start: 2025-03-10

## 2025-03-10 RX ORDER — METOPROLOL SUCCINATE 50 MG/1
50 TABLET, EXTENDED RELEASE ORAL DAILY
Qty: 30 TABLET | Refills: 0 | Status: SHIPPED | OUTPATIENT
Start: 2025-03-10

## 2025-03-10 NOTE — TELEPHONE ENCOUNTER
Last OV: 23 - Pito  Next OV: 25 - Pito  Last Labs: 23  Last EK23   Last Filled: (all 3) 25 #30 1 tab qd 0rf    Patient called and lvm to call office. Needs notified that she must complete labs before  appt

## 2025-04-06 ENCOUNTER — HOSPITAL ENCOUNTER (EMERGENCY)
Age: 65
Discharge: ANOTHER HOSPITAL | End: 2025-04-06
Attending: EMERGENCY MEDICINE
Payer: COMMERCIAL

## 2025-04-06 ENCOUNTER — APPOINTMENT (OUTPATIENT)
Dept: CT IMAGING | Age: 65
End: 2025-04-06
Payer: COMMERCIAL

## 2025-04-06 VITALS
HEART RATE: 72 BPM | OXYGEN SATURATION: 98 % | DIASTOLIC BLOOD PRESSURE: 61 MMHG | TEMPERATURE: 98.3 F | BODY MASS INDEX: 25.04 KG/M2 | RESPIRATION RATE: 16 BRPM | WEIGHT: 136.91 LBS | SYSTOLIC BLOOD PRESSURE: 125 MMHG

## 2025-04-06 DIAGNOSIS — E87.1 HYPONATREMIA: ICD-10-CM

## 2025-04-06 DIAGNOSIS — S22.41XA CLOSED FRACTURE OF MULTIPLE RIBS OF RIGHT SIDE, INITIAL ENCOUNTER: Primary | ICD-10-CM

## 2025-04-06 DIAGNOSIS — S42.002A CLOSED NONDISPLACED FRACTURE OF LEFT CLAVICLE, UNSPECIFIED PART OF CLAVICLE, INITIAL ENCOUNTER: ICD-10-CM

## 2025-04-06 DIAGNOSIS — J94.2 HEMOTHORAX ON LEFT: ICD-10-CM
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
## 2025-04-06 LAB
ANION GAP SERPL CALCULATED.3IONS-SCNC: 12 MMOL/L (ref 3–16)
BASOPHILS # BLD: 0 K/UL (ref 0–0.2)
BASOPHILS NFR BLD: 0.4 %
BUN SERPL-MCNC: 9 MG/DL (ref 7–20)
CALCIUM SERPL-MCNC: 9.2 MG/DL (ref 8.3–10.6)
CHLORIDE SERPL-SCNC: 77 MMOL/L (ref 99–110)
CO2 SERPL-SCNC: 23 MMOL/L (ref 21–32)
CREAT SERPL-MCNC: 0.5 MG/DL (ref 0.6–1.2)
DEPRECATED RDW RBC AUTO: 12.8 % (ref 12.4–15.4)
EOSINOPHIL # BLD: 0 K/UL (ref 0–0.6)
EOSINOPHIL NFR BLD: 0.1 %
GFR SERPLBLD CREATININE-BSD FMLA CKD-EPI: >90 ML/MIN/{1.73_M2}
GLUCOSE SERPL-MCNC: 155 MG/DL (ref 70–99)
HCT VFR BLD AUTO: 32.7 % (ref 36–48)
HGB BLD-MCNC: 11.7 G/DL (ref 12–16)
LYMPHOCYTES # BLD: 2.1 K/UL (ref 1–5.1)
LYMPHOCYTES NFR BLD: 16.6 %
MCH RBC QN AUTO: 33.8 PG (ref 26–34)
MCHC RBC AUTO-ENTMCNC: 35.9 G/DL (ref 31–36)
MCV RBC AUTO: 94.2 FL (ref 80–100)
MONOCYTES # BLD: 1.2 K/UL (ref 0–1.3)
MONOCYTES NFR BLD: 9.1 %
NEUTROPHILS # BLD: 9.5 K/UL (ref 1.7–7.7)
NEUTROPHILS NFR BLD: 73.8 %
PLATELET # BLD AUTO: 286 K/UL (ref 135–450)
PMV BLD AUTO: 6.9 FL (ref 5–10.5)
POTASSIUM SERPL-SCNC: 3.9 MMOL/L (ref 3.5–5.1)
RBC # BLD AUTO: 3.47 M/UL (ref 4–5.2)
SODIUM SERPL-SCNC: 112 MMOL/L (ref 136–145)
TROPONIN, HIGH SENSITIVITY: 18 NG/L (ref 0–14)
TROPONIN, HIGH SENSITIVITY: 27 NG/L (ref 0–14)
WBC # BLD AUTO: 12.9 K/UL (ref 4–11)

## 2025-04-06 PROCEDURE — 2580000003 HC RX 258: Performed by: PHYSICIAN ASSISTANT

## 2025-04-06 PROCEDURE — 93005 ELECTROCARDIOGRAM TRACING: CPT | Performed by: PHYSICIAN ASSISTANT

## 2025-04-06 PROCEDURE — 80048 BASIC METABOLIC PNL TOTAL CA: CPT

## 2025-04-06 PROCEDURE — 71250 CT THORAX DX C-: CPT

## 2025-04-06 PROCEDURE — 84484 ASSAY OF TROPONIN QUANT: CPT

## 2025-04-06 PROCEDURE — 85025 COMPLETE CBC W/AUTO DIFF WBC: CPT

## 2025-04-06 PROCEDURE — 99285 EMERGENCY DEPT VISIT HI MDM: CPT

## 2025-04-06 PROCEDURE — 36415 COLL VENOUS BLD VENIPUNCTURE: CPT

## 2025-04-06 PROCEDURE — 6370000000 HC RX 637 (ALT 250 FOR IP): Performed by: PHYSICIAN ASSISTANT

## 2025-04-06 RX ORDER — ACETAMINOPHEN 500 MG
1000 TABLET ORAL ONCE
Status: COMPLETED | OUTPATIENT
Start: 2025-04-06 | End: 2025-04-06

## 2025-04-06 RX ORDER — SODIUM CHLORIDE 9 MG/ML
INJECTION, SOLUTION INTRAVENOUS CONTINUOUS
Status: DISCONTINUED | OUTPATIENT
Start: 2025-04-06 | End: 2025-04-06 | Stop reason: HOSPADM

## 2025-04-06 RX ADMIN — ACETAMINOPHEN 500 MG: 500 TABLET ORAL at 18:39

## 2025-04-06 RX ADMIN — SODIUM CHLORIDE: 9 INJECTION, SOLUTION INTRAVENOUS at 18:29

## 2025-04-06 ASSESSMENT — PAIN - FUNCTIONAL ASSESSMENT
PAIN_FUNCTIONAL_ASSESSMENT: 0-10
PAIN_FUNCTIONAL_ASSESSMENT: ACTIVITIES ARE NOT PREVENTED

## 2025-04-06 ASSESSMENT — PAIN DESCRIPTION - DESCRIPTORS: DESCRIPTORS: ACHING

## 2025-04-06 ASSESSMENT — PAIN DESCRIPTION - ORIENTATION: ORIENTATION: LEFT

## 2025-04-06 ASSESSMENT — PAIN DESCRIPTION - LOCATION: LOCATION: RIB CAGE

## 2025-04-06 ASSESSMENT — PAIN SCALES - GENERAL: PAINLEVEL_OUTOF10: 8

## 2025-04-06 ASSESSMENT — PAIN DESCRIPTION - PAIN TYPE: TYPE: ACUTE PAIN

## 2025-04-06 NOTE — ED PROVIDER NOTES
talking.    Medical decision making: CT Scan of her chest revealed fractures of 6 ribs with a small hemothorax.  The physician assistant talked to [facility] and they stated they would except the patient for transfer.  Her sodium was low at 110.  Therefore patient was transferred by ambulance.  She was given sodium chloride in Emergency Department.  Troponin is mildly elevated at 27 which is probably due to the fall.  Remainder workup was unremarkable.  Patient was transferred by ambulance.  Emergency [place] was notified by the physician assistant.    Vitals:    04/06/25 2000   BP: 125/61   Pulse: 72   Resp: 16   Temp:    SpO2:        Lab results  Labs Reviewed   CBC WITH AUTO DIFFERENTIAL - Abnormal; Notable for the following components:       Result Value    WBC 12.9 (*)     RBC 3.47 (*)     Hemoglobin 11.7 (*)     Hematocrit 32.7 (*)     Neutrophils Absolute 9.5 (*)     All other components within normal limits   BASIC METABOLIC PANEL W/ REFLEX TO MG FOR LOW K - Abnormal; Notable for the following components:    Sodium 112 (*)     Chloride 77 (*)     Glucose 155 (*)     Creatinine 0.5 (*)     All other components within normal limits    Narrative:     CALL  [facility] tel. [phone],  Chemistry results called to and read back by JACQUE CALHOUN, 04/06/2025  18:16, by SOLAS   TROPONIN - Abnormal; Notable for the following components:    Troponin, High Sensitivity 18 (*)     All other components within normal limits    Narrative:     CALL  [facility] tel. [phone],  Chemistry results called to and read back by JACQUE CALHOUN, 04/06/2025  18:16, by SOLAS   TROPONIN - Abnormal; Notable for the following components:    Troponin, High Sensitivity 27 (*)     All other components within normal limits       Radiology results  CT CHEST WO CONTRAST   Final Result   1. Acute fractures of the left posterior 6th through 9th ribs as detailed   above (grade 3 chest wall injury).  Additional fractures of 
Closed fracture of multiple ribs of right side, initial encounter    2. Fall, initial encounter    3. Hemothorax on left    4. Closed nondisplaced fracture of left clavicle, unspecified part of clavicle, initial encounter    5. Hyponatremia          DISPOSITION/PLAN     DISPOSITION Decision To Transfer 04/06/2025 05:20:33 PM   DISPOSITION CONDITION Stable           PATIENT REFERRED TO:  No follow-up provider specified.    DISCHARGE MEDICATIONS:  New Prescriptions    No medications on file       DISCONTINUED MEDICATIONS:  Discontinued Medications    No medications on file              (Please note that portions of this note were completed with a voice recognition program.  Efforts were made to edit the dictations but occasionally words are mis-transcribed.)    COLT Reeves (electronically signed)        Calvin Smith PA  04/06/25 1932

## 2025-04-06 NOTE — ED NOTES
Patient transferred from [facility] waiting room area to ED room, primary RN given report

## 2025-04-06 NOTE — ED NOTES
Spoke to best friend, Dyan, per pt approval. Dyan states she is on her way to [facility].

## 2025-04-07 LAB
EKG ATRIAL RATE: 77 BPM
EKG DIAGNOSIS: NORMAL
EKG P AXIS: 49 DEGREES
EKG P-R INTERVAL: 176 MS
EKG Q-T INTERVAL: 452 MS
EKG QRS DURATION: 90 MS
EKG QTC CALCULATION (BAZETT): 511 MS
EKG R AXIS: 23 DEGREES
EKG T AXIS: 40 DEGREES
EKG VENTRICULAR RATE: 77 BPM

## 2025-04-07 PROCEDURE — 93010 ELECTROCARDIOGRAM REPORT: CPT | Performed by: INTERNAL MEDICINE

## 2025-04-09 DIAGNOSIS — I50.22 CHRONIC SYSTOLIC CHF (CONGESTIVE HEART FAILURE), NYHA CLASS 2 (HCC): ICD-10-CM

## 2025-04-09 DIAGNOSIS — I50.22 CHRONIC SYSTOLIC HF (HEART FAILURE) (HCC): ICD-10-CM

## 2025-04-09 DIAGNOSIS — I42.8 NONISCHEMIC CARDIOMYOPATHY (HCC): ICD-10-CM

## 2025-04-09 RX ORDER — LISINOPRIL 5 MG/1
5 TABLET ORAL DAILY
Qty: 30 TABLET | Refills: 0 | OUTPATIENT
Start: 2025-04-09

## 2025-04-09 RX ORDER — FUROSEMIDE 20 MG/1
20 TABLET ORAL EVERY MORNING
Qty: 30 TABLET | Refills: 0 | OUTPATIENT
Start: 2025-04-09

## 2025-04-09 RX ORDER — METOPROLOL SUCCINATE 50 MG/1
50 TABLET, EXTENDED RELEASE ORAL DAILY
Qty: 30 TABLET | Refills: 0 | OUTPATIENT
Start: 2025-04-09

## 2025-04-09 RX ORDER — SPIRONOLACTONE 25 MG/1
TABLET ORAL
Qty: 30 TABLET | Refills: 0 | OUTPATIENT
Start: 2025-04-09

## 2025-04-11 ENCOUNTER — TELEPHONE (OUTPATIENT)
Dept: CARDIOLOGY CLINIC | Age: 65
End: 2025-04-11

## 2025-04-11 DIAGNOSIS — I50.22 CHRONIC SYSTOLIC HF (HEART FAILURE) (HCC): Primary | ICD-10-CM

## 2025-04-11 DIAGNOSIS — E87.1 HYPONATREMIA: ICD-10-CM

## 2025-04-11 RX ORDER — SODIUM CHLORIDE 1 G/1
2 TABLET ORAL 2 TIMES DAILY
COMMUNITY
Start: 2025-04-10

## 2025-04-11 NOTE — TELEPHONE ENCOUNTER
Called and spoke with pt.  We reviewed pt's stay at [facility] and her medications.  All pt's questions answered.  Pt does not wish to follow up with UC.  Placed lab orders for follow up NA and K labs to be drawn next week.  Pt voiced understanding of all and will get her labs drawn next week.

## 2025-04-11 NOTE — TELEPHONE ENCOUNTER
Patient states Kt was low.  UC put her on sodium chloride 1,000 mg TbSO Take 2 tablets by mouth 2 times daily    Concerned about her labs and medications they put her on.  Refused the Oxycodone did not get any other medications.    Please advise

## 2025-04-11 NOTE — TELEPHONE ENCOUNTER
Cortney called the office to relay that she was seen at [facility] for some broken ribs and was transferred to  via ambulance.     Patient was upset on the phone explaining how they told her her sodium and potassium were low. She states she doesn't understand because she takes her medication as prescribed. She also relayed that they administered blood thinners in the back of arms and she states Dr. Sheldon has never put her on blood thinners. She would like a call back to discuss.    Please advise.    Cortney's callback: [phone]

## 2025-04-17 DIAGNOSIS — I50.22 CHRONIC SYSTOLIC HF (HEART FAILURE) (HCC): ICD-10-CM

## 2025-04-17 DIAGNOSIS — I50.22 CHRONIC SYSTOLIC CHF (CONGESTIVE HEART FAILURE), NYHA CLASS 2 (HCC): ICD-10-CM

## 2025-04-17 DIAGNOSIS — I42.8 NONISCHEMIC CARDIOMYOPATHY (HCC): ICD-10-CM

## 2025-04-17 NOTE — TELEPHONE ENCOUNTER
Scheduled follow up with Dr. Sheldon. Please send refills to pharmacy on file.    Last O/V:  8/22/2023  Next O/V:  7/1/2025    Last Labs:  BMP:  4/6/2025

## 2025-04-18 DIAGNOSIS — I50.22 CHRONIC SYSTOLIC HF (HEART FAILURE) (HCC): ICD-10-CM

## 2025-04-18 DIAGNOSIS — I42.8 NONISCHEMIC CARDIOMYOPATHY (HCC): ICD-10-CM

## 2025-04-18 RX ORDER — SPIRONOLACTONE 25 MG/1
25 TABLET ORAL DAILY
Qty: 90 TABLET | Refills: 0 | Status: SHIPPED | OUTPATIENT
Start: 2025-04-18

## 2025-04-18 RX ORDER — LISINOPRIL 5 MG/1
5 TABLET ORAL DAILY
Qty: 90 TABLET | Refills: 0 | Status: SHIPPED | OUTPATIENT
Start: 2025-04-18

## 2025-04-18 RX ORDER — METOPROLOL SUCCINATE 50 MG/1
50 TABLET, EXTENDED RELEASE ORAL DAILY
Qty: 30 TABLET | Refills: 0 | OUTPATIENT
Start: 2025-04-18

## 2025-04-18 RX ORDER — METOPROLOL SUCCINATE 50 MG/1
50 TABLET, EXTENDED RELEASE ORAL DAILY
Qty: 90 TABLET | Refills: 0 | Status: SHIPPED | OUTPATIENT
Start: 2025-04-18

## 2025-04-18 RX ORDER — FUROSEMIDE 20 MG/1
20 TABLET ORAL EVERY MORNING
Qty: 90 TABLET | Refills: 0 | Status: SHIPPED | OUTPATIENT
Start: 2025-04-18

## 2025-04-18 NOTE — TELEPHONE ENCOUNTER
metoprolol succinate (TOPROL XL) 50 MG extended release tablet 90 tablet 0 4/18/2025 --    Sig - Route: Take 1 tablet by mouth daily - Oral    Sent to pharmacy as: Metoprolol Succinate ER 50 MG Oral Tablet Extended Release 24 Hour (TOPROL XL)    Notes to Pharmacy: Must keep appt 7/1 for more refills!!!    Cosign for Ordering: Required by Santo Sheldon MD    E-Prescribing Status: Receipt confirmed by pharmacy (4/18/2025 10:16 AM EDT)

## 2025-07-01 ENCOUNTER — OFFICE VISIT (OUTPATIENT)
Dept: CARDIOLOGY CLINIC | Age: 65
End: 2025-07-01
Payer: COMMERCIAL

## 2025-07-01 VITALS
DIASTOLIC BLOOD PRESSURE: 58 MMHG | SYSTOLIC BLOOD PRESSURE: 108 MMHG | HEIGHT: 62 IN | OXYGEN SATURATION: 97 % | BODY MASS INDEX: 25.03 KG/M2 | HEART RATE: 68 BPM | WEIGHT: 136 LBS | RESPIRATION RATE: 16 BRPM

## 2025-07-01 DIAGNOSIS — I10 ESSENTIAL HYPERTENSION: ICD-10-CM

## 2025-07-01 DIAGNOSIS — I50.22 CHRONIC SYSTOLIC HF (HEART FAILURE) (HCC): ICD-10-CM

## 2025-07-01 DIAGNOSIS — I50.22 CHRONIC SYSTOLIC CHF (CONGESTIVE HEART FAILURE), NYHA CLASS 2 (HCC): Primary | ICD-10-CM

## 2025-07-01 DIAGNOSIS — E87.1 HYPONATREMIA: ICD-10-CM

## 2025-07-01 DIAGNOSIS — Z72.0 TOBACCO ABUSE: ICD-10-CM

## 2025-07-01 DIAGNOSIS — I42.8 NONISCHEMIC CARDIOMYOPATHY (HCC): ICD-10-CM

## 2025-07-01 LAB
ALBUMIN SERPL-MCNC: 4.3 G/DL (ref 3.4–5)
ALBUMIN/GLOB SERPL: 1.4 {RATIO} (ref 1.1–2.2)
ALP SERPL-CCNC: 112 U/L (ref 40–129)
ALT SERPL-CCNC: 32 U/L (ref 10–40)
ANION GAP SERPL CALCULATED.3IONS-SCNC: 12 MMOL/L (ref 3–16)
AST SERPL-CCNC: 47 U/L (ref 15–37)
BILIRUB SERPL-MCNC: <0.2 MG/DL (ref 0–1)
BUN SERPL-MCNC: 6 MG/DL (ref 7–20)
CALCIUM SERPL-MCNC: 9.3 MG/DL (ref 8.3–10.6)
CHLORIDE SERPL-SCNC: 85 MMOL/L (ref 99–110)
CO2 SERPL-SCNC: 22 MMOL/L (ref 21–32)
CREAT SERPL-MCNC: 0.5 MG/DL (ref 0.6–1.2)
DEPRECATED RDW RBC AUTO: 12.6 % (ref 12.4–15.4)
GFR SERPLBLD CREATININE-BSD FMLA CKD-EPI: >90 ML/MIN/{1.73_M2}
GLUCOSE SERPL-MCNC: 108 MG/DL (ref 70–99)
HCT VFR BLD AUTO: 34.5 % (ref 36–48)
HGB BLD-MCNC: 12 G/DL (ref 12–16)
MCH RBC QN AUTO: 33 PG (ref 26–34)
MCHC RBC AUTO-ENTMCNC: 34.9 G/DL (ref 31–36)
MCV RBC AUTO: 94.6 FL (ref 80–100)
PLATELET # BLD AUTO: 341 K/UL (ref 135–450)
PMV BLD AUTO: 7.1 FL (ref 5–10.5)
POTASSIUM SERPL-SCNC: 4.1 MMOL/L (ref 3.5–5.1)
PROT SERPL-MCNC: 7.4 G/DL (ref 6.4–8.2)
RBC # BLD AUTO: 3.64 M/UL (ref 4–5.2)
SODIUM SERPL-SCNC: 119 MMOL/L (ref 136–145)
WBC # BLD AUTO: 10.4 K/UL (ref 4–11)

## 2025-07-01 PROCEDURE — 3074F SYST BP LT 130 MM HG: CPT | Performed by: INTERNAL MEDICINE

## 2025-07-01 PROCEDURE — G2211 COMPLEX E/M VISIT ADD ON: HCPCS | Performed by: INTERNAL MEDICINE

## 2025-07-01 PROCEDURE — 99214 OFFICE O/P EST MOD 30 MIN: CPT | Performed by: INTERNAL MEDICINE

## 2025-07-01 PROCEDURE — 3078F DIAST BP <80 MM HG: CPT | Performed by: INTERNAL MEDICINE

## 2025-07-01 RX ORDER — FUROSEMIDE 20 MG/1
20 TABLET ORAL EVERY MORNING
Qty: 90 TABLET | Refills: 3 | Status: SHIPPED | OUTPATIENT
Start: 2025-07-01 | End: 2025-07-03 | Stop reason: ALTCHOICE

## 2025-07-01 RX ORDER — ASPIRIN 81 MG/1
81 TABLET ORAL DAILY
COMMUNITY

## 2025-07-01 RX ORDER — LISINOPRIL 5 MG/1
5 TABLET ORAL DAILY
Qty: 90 TABLET | Refills: 3 | Status: SHIPPED | OUTPATIENT
Start: 2025-07-01

## 2025-07-01 RX ORDER — SPIRONOLACTONE 25 MG/1
25 TABLET ORAL DAILY
Qty: 90 TABLET | Refills: 3 | Status: SHIPPED | OUTPATIENT
Start: 2025-07-01 | End: 2025-07-03 | Stop reason: ALTCHOICE

## 2025-07-01 RX ORDER — METOPROLOL SUCCINATE 50 MG/1
50 TABLET, EXTENDED RELEASE ORAL DAILY
Qty: 90 TABLET | Refills: 3 | Status: SHIPPED | OUTPATIENT
Start: 2025-07-01

## 2025-07-01 NOTE — PROGRESS NOTES
[facility]    Cortney Roldan  1960 July 2, 2025      CC: \" I feel okay\"    HPI:  The patient is 64 y.o. female with a past medical history significant for tobacco abuse, asthma and systolic heart failure. An echocardiogram completed 5/24/18 showed LVEF of 20-25%. She had an admission to  4/2025 after a fall.     Today she presents for follow up and states that overall she is feeling well. She reports since her hospitalization in April she has not been taking the sodium tablets.  She did not have the repeat blood work that we ordered. She reports medication compliance and is tolerating. She denies any abnormal bleeding or bruising. She denies exertional chest pain/pressure, dyspnea at rest, worsening CANO, PND, orthopnea, palpitations, lightheadedness, weight changes, changes in LE edema, and syncope.       Review of Systems:  Constitutional: No fatigue, weakness, night sweats or fever.   HEENT: No new vision difficulties or ringing in the ears.  Respiratory: No new SOB, PND, orthopnea or cough.   Cardiovascular: See HPI   GI: No n/v, diarrhea, constipation, abdominal pain or changes in bowel habits.  No melena, no hematochezia  : No urinary frequency, urgency, incontinence, hematuria or dysuria.  Skin: No cyanosis or skin lesions.  Musculoskeletal: No new muscle or joint pain.  Neurological: No syncope or TIA-like symptoms.  Psychiatric: No anxiety, insomnia or depression     Past Medical History:   Diagnosis Date    Asthma     CHF (congestive heart failure) (HCC)     Hypertension      No past surgical history on file.  Family History   Problem Relation Age of Onset    Cancer Father         throat     Other Father         alzheimer     Social History     Tobacco Use    Smoking status: Some Days     Current packs/day: 0.50     Types: Cigarettes    Smokeless tobacco: Former    Tobacco comments:     Down to 1 cigarette today   Vaping Use    Vaping status: Never Used   Substance

## 2025-07-02 ENCOUNTER — RESULTS FOLLOW-UP (OUTPATIENT)
Dept: CARDIOLOGY CLINIC | Age: 65
End: 2025-07-02

## 2025-07-02 ENCOUNTER — TELEPHONE (OUTPATIENT)
Dept: EMERGENCY DEPT | Age: 65
End: 2025-07-02

## 2025-07-02 DIAGNOSIS — E87.1 HYPONATREMIA: ICD-10-CM

## 2025-07-02 DIAGNOSIS — I50.22 CHRONIC SYSTOLIC CHF (CONGESTIVE HEART FAILURE), NYHA CLASS 2 (HCC): Primary | ICD-10-CM

## 2025-07-03 RX ORDER — TORSEMIDE 10 MG/1
10 TABLET ORAL DAILY
Qty: 30 TABLET | Refills: 3 | Status: SHIPPED | OUTPATIENT
Start: 2025-07-03

## 2025-07-12 ENCOUNTER — TELEPHONE (OUTPATIENT)
Dept: CARDIOLOGY CLINIC | Age: 65
End: 2025-07-12

## 2025-07-12 DIAGNOSIS — I50.22 CHRONIC SYSTOLIC HF (HEART FAILURE) (HCC): ICD-10-CM

## 2025-07-12 DIAGNOSIS — I42.8 NONISCHEMIC CARDIOMYOPATHY (HCC): ICD-10-CM

## 2025-07-12 DIAGNOSIS — I50.22 CHRONIC SYSTOLIC CHF (CONGESTIVE HEART FAILURE), NYHA CLASS 2 (HCC): ICD-10-CM

## 2025-07-15 RX ORDER — SPIRONOLACTONE 25 MG/1
25 TABLET ORAL DAILY
Qty: 90 TABLET | Refills: 0 | OUTPATIENT
Start: 2025-07-15

## 2025-07-15 RX ORDER — FUROSEMIDE 20 MG/1
20 TABLET ORAL EVERY MORNING
Qty: 90 TABLET | Refills: 0 | Status: SHIPPED | OUTPATIENT
Start: 2025-07-15 | End: 2025-07-15 | Stop reason: ALTCHOICE

## 2025-07-15 NOTE — TELEPHONE ENCOUNTER
[facility] pharmacy called stating that they received script for torsemide 10 MG on 7/3 and then received a script for furosemide today. [facility] would like clarification on what medication is needed.    [facility] [address] [facility] ([place]), [place] - [address] - P [phone] - F [phone]

## 2025-07-17 ENCOUNTER — TELEPHONE (OUTPATIENT)
Dept: CARDIOLOGY CLINIC | Age: 65
End: 2025-07-17

## 2025-07-17 NOTE — TELEPHONE ENCOUNTER
Cortney called asking what medication did Dr. Sheldon take her off at last visit now surem of her medications of what she should or should not be taken?     Call back number [phone]

## 2025-07-17 NOTE — TELEPHONE ENCOUNTER
Spoke with pt to clarify her medication questions.   Pt was instructed to discontinue Lasix and spirolactone. She was to start Torsemide 10 mg. Pt states that she started the torsemide yesterday and has some nausea. She will try it again today to see if she has the same symptoms. She will call back with any issues.